# Patient Record
Sex: FEMALE | Race: WHITE | NOT HISPANIC OR LATINO | Employment: FULL TIME | URBAN - METROPOLITAN AREA
[De-identification: names, ages, dates, MRNs, and addresses within clinical notes are randomized per-mention and may not be internally consistent; named-entity substitution may affect disease eponyms.]

---

## 2017-01-09 ENCOUNTER — HOSPITAL ENCOUNTER (OUTPATIENT)
Dept: LAB | Facility: MEDICAL CENTER | Age: 32
End: 2017-01-09
Attending: SURGERY
Payer: COMMERCIAL

## 2017-01-09 LAB — TSH SERPL DL<=0.005 MIU/L-ACNC: 3.68 UIU/ML (ref 0.3–3.7)

## 2017-01-09 PROCEDURE — 36415 COLL VENOUS BLD VENIPUNCTURE: CPT

## 2017-01-09 PROCEDURE — 84443 ASSAY THYROID STIM HORMONE: CPT

## 2017-02-15 ENCOUNTER — HOSPITAL ENCOUNTER (OUTPATIENT)
Dept: LAB | Facility: MEDICAL CENTER | Age: 32
End: 2017-02-15
Attending: SURGERY
Payer: COMMERCIAL

## 2017-02-15 LAB — TSH SERPL DL<=0.005 MIU/L-ACNC: 2.12 UIU/ML (ref 0.3–3.7)

## 2017-02-15 PROCEDURE — 84443 ASSAY THYROID STIM HORMONE: CPT

## 2017-02-15 PROCEDURE — 36415 COLL VENOUS BLD VENIPUNCTURE: CPT

## 2017-06-12 ENCOUNTER — HOSPITAL ENCOUNTER (OUTPATIENT)
Dept: LAB | Facility: MEDICAL CENTER | Age: 32
End: 2017-06-12
Attending: FAMILY MEDICINE
Payer: COMMERCIAL

## 2017-06-12 LAB
T4 FREE SERPL-MCNC: 1.19 NG/DL (ref 0.53–1.43)
TSH SERPL DL<=0.005 MIU/L-ACNC: 2.77 UIU/ML (ref 0.3–3.7)

## 2017-06-12 PROCEDURE — 36415 COLL VENOUS BLD VENIPUNCTURE: CPT

## 2017-06-12 PROCEDURE — 84439 ASSAY OF FREE THYROXINE: CPT

## 2017-06-12 PROCEDURE — 84443 ASSAY THYROID STIM HORMONE: CPT

## 2017-11-09 ENCOUNTER — HOSPITAL ENCOUNTER (OUTPATIENT)
Dept: LAB | Facility: MEDICAL CENTER | Age: 32
End: 2017-11-09
Attending: FAMILY MEDICINE
Payer: COMMERCIAL

## 2017-11-09 LAB
ALBUMIN SERPL BCP-MCNC: 4.4 G/DL (ref 3.2–4.9)
ALBUMIN/GLOB SERPL: 1.7 G/DL
ALP SERPL-CCNC: 47 U/L (ref 30–99)
ALT SERPL-CCNC: 15 U/L (ref 2–50)
ANION GAP SERPL CALC-SCNC: 8 MMOL/L (ref 0–11.9)
AST SERPL-CCNC: 17 U/L (ref 12–45)
BASOPHILS # BLD AUTO: 1.1 % (ref 0–1.8)
BASOPHILS # BLD: 0.07 K/UL (ref 0–0.12)
BILIRUB SERPL-MCNC: 0.6 MG/DL (ref 0.1–1.5)
BUN SERPL-MCNC: 13 MG/DL (ref 8–22)
CALCIUM SERPL-MCNC: 8.9 MG/DL (ref 8.5–10.5)
CHLORIDE SERPL-SCNC: 104 MMOL/L (ref 96–112)
CO2 SERPL-SCNC: 24 MMOL/L (ref 20–33)
CREAT SERPL-MCNC: 0.72 MG/DL (ref 0.5–1.4)
CRP SERPL HS-MCNC: 1.8 MG/L (ref 0–7.5)
EOSINOPHIL # BLD AUTO: 0.12 K/UL (ref 0–0.51)
EOSINOPHIL NFR BLD: 2 % (ref 0–6.9)
ERYTHROCYTE [DISTWIDTH] IN BLOOD BY AUTOMATED COUNT: 39.3 FL (ref 35.9–50)
ERYTHROCYTE [SEDIMENTATION RATE] IN BLOOD BY WESTERGREN METHOD: 9 MM/HOUR (ref 0–20)
GFR SERPL CREATININE-BSD FRML MDRD: >60 ML/MIN/1.73 M 2
GLOBULIN SER CALC-MCNC: 2.6 G/DL (ref 1.9–3.5)
GLUCOSE SERPL-MCNC: 84 MG/DL (ref 65–99)
HCT VFR BLD AUTO: 44.1 % (ref 37–47)
HGB BLD-MCNC: 15.2 G/DL (ref 12–16)
IMM GRANULOCYTES # BLD AUTO: 0.02 K/UL (ref 0–0.11)
IMM GRANULOCYTES NFR BLD AUTO: 0.3 % (ref 0–0.9)
LIPASE SERPL-CCNC: 18 U/L (ref 11–82)
LYMPHOCYTES # BLD AUTO: 1.87 K/UL (ref 1–4.8)
LYMPHOCYTES NFR BLD: 30.5 % (ref 22–41)
MCH RBC QN AUTO: 31.3 PG (ref 27–33)
MCHC RBC AUTO-ENTMCNC: 34.5 G/DL (ref 33.6–35)
MCV RBC AUTO: 90.7 FL (ref 81.4–97.8)
MONOCYTES # BLD AUTO: 0.57 K/UL (ref 0–0.85)
MONOCYTES NFR BLD AUTO: 9.3 % (ref 0–13.4)
NEUTROPHILS # BLD AUTO: 3.48 K/UL (ref 2–7.15)
NEUTROPHILS NFR BLD: 56.8 % (ref 44–72)
NRBC # BLD AUTO: 0 K/UL
NRBC BLD AUTO-RTO: 0 /100 WBC
PLATELET # BLD AUTO: 231 K/UL (ref 164–446)
PMV BLD AUTO: 10.6 FL (ref 9–12.9)
POTASSIUM SERPL-SCNC: 4 MMOL/L (ref 3.6–5.5)
PROT SERPL-MCNC: 7 G/DL (ref 6–8.2)
RBC # BLD AUTO: 4.86 M/UL (ref 4.2–5.4)
SODIUM SERPL-SCNC: 136 MMOL/L (ref 135–145)
WBC # BLD AUTO: 6.1 K/UL (ref 4.8–10.8)

## 2017-11-09 PROCEDURE — 36415 COLL VENOUS BLD VENIPUNCTURE: CPT

## 2017-11-09 PROCEDURE — 83516 IMMUNOASSAY NONANTIBODY: CPT

## 2017-11-09 PROCEDURE — 85652 RBC SED RATE AUTOMATED: CPT

## 2017-11-09 PROCEDURE — 83690 ASSAY OF LIPASE: CPT

## 2017-11-09 PROCEDURE — 86141 C-REACTIVE PROTEIN HS: CPT

## 2017-11-09 PROCEDURE — 80053 COMPREHEN METABOLIC PANEL: CPT

## 2017-11-09 PROCEDURE — 85025 COMPLETE CBC W/AUTO DIFF WBC: CPT

## 2017-11-13 ENCOUNTER — HOSPITAL ENCOUNTER (OUTPATIENT)
Dept: LAB | Facility: MEDICAL CENTER | Age: 32
End: 2017-11-13
Attending: FAMILY MEDICINE
Payer: COMMERCIAL

## 2017-11-13 LAB — GLIADIN PEPTIDE+TTG IGA+IGG SER QL IA: 13 UNITS (ref 0–19)

## 2017-11-13 PROCEDURE — 83013 H PYLORI (C-13) BREATH: CPT

## 2017-11-15 LAB — UREA BREATH TEST QL: NEGATIVE

## 2017-12-26 ENCOUNTER — NON-PROVIDER VISIT (OUTPATIENT)
Dept: URGENT CARE | Facility: PHYSICIAN GROUP | Age: 32
End: 2017-12-26

## 2017-12-26 DIAGNOSIS — Z11.1 ENCOUNTER FOR PPD TEST: ICD-10-CM

## 2017-12-26 PROCEDURE — 86580 TB INTRADERMAL TEST: CPT | Performed by: PHYSICIAN ASSISTANT

## 2017-12-28 ENCOUNTER — NON-PROVIDER VISIT (OUTPATIENT)
Dept: URGENT CARE | Facility: PHYSICIAN GROUP | Age: 32
End: 2017-12-28

## 2017-12-28 DIAGNOSIS — Z11.1 ENCOUNTER FOR PPD SKIN TEST READING: ICD-10-CM

## 2017-12-28 LAB — TB WHEAL 3D P 5 TU DIAM: NORMAL MM

## 2018-03-27 ENCOUNTER — HOSPITAL ENCOUNTER (OUTPATIENT)
Dept: RADIOLOGY | Facility: MEDICAL CENTER | Age: 33
End: 2018-03-27
Attending: FAMILY MEDICINE
Payer: COMMERCIAL

## 2018-03-27 DIAGNOSIS — M25.569 KNEE PAIN, UNSPECIFIED CHRONICITY, UNSPECIFIED LATERALITY: ICD-10-CM

## 2018-03-27 PROCEDURE — 73560 X-RAY EXAM OF KNEE 1 OR 2: CPT | Mod: LT

## 2018-08-29 ENCOUNTER — HOSPITAL ENCOUNTER (OUTPATIENT)
Dept: LAB | Facility: MEDICAL CENTER | Age: 33
End: 2018-08-29
Attending: FAMILY MEDICINE
Payer: COMMERCIAL

## 2018-08-29 LAB — TSH SERPL DL<=0.005 MIU/L-ACNC: 2.61 UIU/ML (ref 0.38–5.33)

## 2018-08-29 PROCEDURE — 84443 ASSAY THYROID STIM HORMONE: CPT

## 2018-08-29 PROCEDURE — 36415 COLL VENOUS BLD VENIPUNCTURE: CPT

## 2018-09-08 ENCOUNTER — HOSPITAL ENCOUNTER (OUTPATIENT)
Facility: MEDICAL CENTER | Age: 33
End: 2018-09-08
Payer: COMMERCIAL

## 2018-09-08 LAB
BDY FAT % MEASURED: 29.7 %
BP DIAS: 74 MMHG
BP SYS: 114 MMHG
HYPERTENSION HTHYP: NO

## 2018-09-09 LAB
CHOLEST SERPL-MCNC: 202 MG/DL (ref 100–199)
FASTING STATUS PATIENT QL REPORTED: NORMAL
GLUCOSE SERPL-MCNC: 89 MG/DL (ref 65–99)
HDLC SERPL-MCNC: 52 MG/DL
LDLC SERPL CALC-MCNC: 134 MG/DL
TRIGL SERPL-MCNC: 79 MG/DL (ref 0–149)

## 2018-09-10 LAB
DIABETES HTDIA: NO
EVENT NAME HTEVT: NORMAL
SCREENING LOC CITY HTCIT: NORMAL
SCREENING LOC STATE HTSTA: NORMAL
SCREENING LOCATION HTLOC: NORMAL
SUBSCRIBER ID HTSID: NORMAL

## 2018-09-12 ENCOUNTER — HOSPITAL ENCOUNTER (OUTPATIENT)
Dept: LAB | Facility: MEDICAL CENTER | Age: 33
End: 2018-09-12
Attending: FAMILY MEDICINE
Payer: COMMERCIAL

## 2018-09-12 LAB
25(OH)D3 SERPL-MCNC: 46 NG/ML (ref 30–100)
ALBUMIN SERPL BCP-MCNC: 4.2 G/DL (ref 3.2–4.9)
ALBUMIN/GLOB SERPL: 1.6 G/DL
ALP SERPL-CCNC: 41 U/L (ref 30–99)
ALT SERPL-CCNC: 15 U/L (ref 2–50)
ANION GAP SERPL CALC-SCNC: 9 MMOL/L (ref 0–11.9)
AST SERPL-CCNC: 16 U/L (ref 12–45)
BASOPHILS # BLD AUTO: 0.6 % (ref 0–1.8)
BASOPHILS # BLD: 0.03 K/UL (ref 0–0.12)
BILIRUB SERPL-MCNC: 0.6 MG/DL (ref 0.1–1.5)
BUN SERPL-MCNC: 12 MG/DL (ref 8–22)
CALCIUM SERPL-MCNC: 9.3 MG/DL (ref 8.5–10.5)
CHLORIDE SERPL-SCNC: 109 MMOL/L (ref 96–112)
CO2 SERPL-SCNC: 23 MMOL/L (ref 20–33)
CREAT SERPL-MCNC: 0.81 MG/DL (ref 0.5–1.4)
EOSINOPHIL # BLD AUTO: 0.09 K/UL (ref 0–0.51)
EOSINOPHIL NFR BLD: 1.7 % (ref 0–6.9)
ERYTHROCYTE [DISTWIDTH] IN BLOOD BY AUTOMATED COUNT: 41.1 FL (ref 35.9–50)
ERYTHROCYTE [SEDIMENTATION RATE] IN BLOOD BY WESTERGREN METHOD: 5 MM/HOUR (ref 0–20)
EST. AVERAGE GLUCOSE BLD GHB EST-MCNC: 108 MG/DL
FASTING STATUS PATIENT QL REPORTED: NORMAL
FERRITIN SERPL-MCNC: 53.7 NG/ML (ref 10–291)
GLOBULIN SER CALC-MCNC: 2.7 G/DL (ref 1.9–3.5)
GLUCOSE SERPL-MCNC: 96 MG/DL (ref 65–99)
HBA1C MFR BLD: 5.4 % (ref 0–5.6)
HCT VFR BLD AUTO: 44.4 % (ref 37–47)
HGB BLD-MCNC: 15.1 G/DL (ref 12–16)
IMM GRANULOCYTES # BLD AUTO: 0.03 K/UL (ref 0–0.11)
IMM GRANULOCYTES NFR BLD AUTO: 0.6 % (ref 0–0.9)
LYMPHOCYTES # BLD AUTO: 1.79 K/UL (ref 1–4.8)
LYMPHOCYTES NFR BLD: 32.9 % (ref 22–41)
MCH RBC QN AUTO: 31.4 PG (ref 27–33)
MCHC RBC AUTO-ENTMCNC: 34 G/DL (ref 33.6–35)
MCV RBC AUTO: 92.3 FL (ref 81.4–97.8)
MONOCYTES # BLD AUTO: 0.32 K/UL (ref 0–0.85)
MONOCYTES NFR BLD AUTO: 5.9 % (ref 0–13.4)
NEUTROPHILS # BLD AUTO: 3.18 K/UL (ref 2–7.15)
NEUTROPHILS NFR BLD: 58.3 % (ref 44–72)
NRBC # BLD AUTO: 0 K/UL
NRBC BLD-RTO: 0 /100 WBC
PLATELET # BLD AUTO: 216 K/UL (ref 164–446)
PMV BLD AUTO: 10.4 FL (ref 9–12.9)
POTASSIUM SERPL-SCNC: 3.8 MMOL/L (ref 3.6–5.5)
PROT SERPL-MCNC: 6.9 G/DL (ref 6–8.2)
RBC # BLD AUTO: 4.81 M/UL (ref 4.2–5.4)
SODIUM SERPL-SCNC: 141 MMOL/L (ref 135–145)
THYROPEROXIDASE AB SERPL-ACNC: <0.2 IU/ML (ref 0–9)
WBC # BLD AUTO: 5.4 K/UL (ref 4.8–10.8)

## 2018-09-12 PROCEDURE — 80053 COMPREHEN METABOLIC PANEL: CPT

## 2018-09-12 PROCEDURE — 36415 COLL VENOUS BLD VENIPUNCTURE: CPT

## 2018-09-12 PROCEDURE — 86039 ANTINUCLEAR ANTIBODIES (ANA): CPT

## 2018-09-12 PROCEDURE — 85025 COMPLETE CBC W/AUTO DIFF WBC: CPT

## 2018-09-12 PROCEDURE — 86376 MICROSOMAL ANTIBODY EACH: CPT

## 2018-09-12 PROCEDURE — 85652 RBC SED RATE AUTOMATED: CPT

## 2018-09-12 PROCEDURE — 82728 ASSAY OF FERRITIN: CPT

## 2018-09-12 PROCEDURE — 83036 HEMOGLOBIN GLYCOSYLATED A1C: CPT

## 2018-09-12 PROCEDURE — 81001 URINALYSIS AUTO W/SCOPE: CPT

## 2018-09-12 PROCEDURE — 87086 URINE CULTURE/COLONY COUNT: CPT

## 2018-09-12 PROCEDURE — 82306 VITAMIN D 25 HYDROXY: CPT

## 2018-09-13 LAB
AMORPH CRY #/AREA URNS HPF: PRESENT /HPF
APPEARANCE UR: ABNORMAL
BACTERIA #/AREA URNS HPF: ABNORMAL /HPF
COLOR UR: YELLOW
GLUCOSE UR STRIP.AUTO-MCNC: NEGATIVE MG/DL
KETONES UR STRIP.AUTO-MCNC: NEGATIVE MG/DL
LEUKOCYTE ESTERASE UR QL STRIP.AUTO: NEGATIVE
MICRO URNS: ABNORMAL
NITRITE UR QL STRIP.AUTO: NEGATIVE
PH UR STRIP.AUTO: 7 [PH]
PROT UR QL STRIP: NEGATIVE MG/DL
RBC UR QL AUTO: NEGATIVE
SP GR UR STRIP.AUTO: 1.02

## 2018-09-14 LAB — NUCLEAR IGG TITR SER IF: ABNORMAL {TITER}

## 2018-09-15 LAB
BACTERIA UR CULT: NORMAL
SIGNIFICANT IND 70042: NORMAL
SITE SITE: NORMAL
SOURCE SOURCE: NORMAL

## 2018-11-02 ENCOUNTER — OFFICE VISIT (OUTPATIENT)
Dept: NEUROLOGY | Facility: MEDICAL CENTER | Age: 33
End: 2018-11-02
Payer: COMMERCIAL

## 2018-11-02 ENCOUNTER — HOSPITAL ENCOUNTER (OUTPATIENT)
Dept: LAB | Facility: MEDICAL CENTER | Age: 33
End: 2018-11-02
Attending: PSYCHIATRY & NEUROLOGY
Payer: COMMERCIAL

## 2018-11-02 VITALS
HEART RATE: 64 BPM | OXYGEN SATURATION: 97 % | HEIGHT: 72 IN | WEIGHT: 180 LBS | TEMPERATURE: 97.8 F | BODY MASS INDEX: 24.38 KG/M2 | SYSTOLIC BLOOD PRESSURE: 110 MMHG | DIASTOLIC BLOOD PRESSURE: 68 MMHG

## 2018-11-02 DIAGNOSIS — R35.0 URINARY FREQUENCY: ICD-10-CM

## 2018-11-02 DIAGNOSIS — R20.2 NUMBNESS AND TINGLING: ICD-10-CM

## 2018-11-02 DIAGNOSIS — R20.0 NUMBNESS AND TINGLING: ICD-10-CM

## 2018-11-02 DIAGNOSIS — H35.30 MACULAR DEGENERATION, UNSPECIFIED LATERALITY, UNSPECIFIED TYPE: ICD-10-CM

## 2018-11-02 DIAGNOSIS — R41.3 MEMORY CHANGES: ICD-10-CM

## 2018-11-02 DIAGNOSIS — R76.8 ELEVATED ANTINUCLEAR ANTIBODY (ANA) LEVEL: ICD-10-CM

## 2018-11-02 DIAGNOSIS — R53.83 FATIGUE, UNSPECIFIED TYPE: ICD-10-CM

## 2018-11-02 LAB
ALBUMIN SERPL BCP-MCNC: 4.7 G/DL (ref 3.2–4.9)
ALBUMIN/GLOB SERPL: 1.6 G/DL
ALP SERPL-CCNC: 49 U/L (ref 30–99)
ALT SERPL-CCNC: 26 U/L (ref 2–50)
ANION GAP SERPL CALC-SCNC: 11 MMOL/L (ref 0–11.9)
APPEARANCE UR: CLEAR
AST SERPL-CCNC: 20 U/L (ref 12–45)
BILIRUB SERPL-MCNC: 0.6 MG/DL (ref 0.1–1.5)
BILIRUB UR QL STRIP.AUTO: NEGATIVE
BUN SERPL-MCNC: 12 MG/DL (ref 8–22)
CALCIUM SERPL-MCNC: 9.6 MG/DL (ref 8.5–10.5)
CHLORIDE SERPL-SCNC: 108 MMOL/L (ref 96–112)
CO2 SERPL-SCNC: 23 MMOL/L (ref 20–33)
COLOR UR: YELLOW
CREAT SERPL-MCNC: 0.81 MG/DL (ref 0.5–1.4)
GLOBULIN SER CALC-MCNC: 2.9 G/DL (ref 1.9–3.5)
GLUCOSE SERPL-MCNC: 126 MG/DL (ref 65–99)
GLUCOSE UR STRIP.AUTO-MCNC: NEGATIVE MG/DL
KETONES UR STRIP.AUTO-MCNC: ABNORMAL MG/DL
LEUKOCYTE ESTERASE UR QL STRIP.AUTO: NEGATIVE
MICRO URNS: ABNORMAL
NITRITE UR QL STRIP.AUTO: NEGATIVE
PH UR STRIP.AUTO: 5.5 [PH]
POTASSIUM SERPL-SCNC: 3.1 MMOL/L (ref 3.6–5.5)
PROT SERPL-MCNC: 7.6 G/DL (ref 6–8.2)
PROT UR QL STRIP: NEGATIVE MG/DL
RBC UR QL AUTO: ABNORMAL
SODIUM SERPL-SCNC: 142 MMOL/L (ref 135–145)
SP GR UR STRIP.AUTO: 1.02
TSH SERPL DL<=0.005 MIU/L-ACNC: 1.27 UIU/ML (ref 0.38–5.33)
UROBILINOGEN UR STRIP.AUTO-MCNC: 0.2 MG/DL
VIT B12 SERPL-MCNC: 499 PG/ML (ref 211–911)

## 2018-11-02 PROCEDURE — 86431 RHEUMATOID FACTOR QUANT: CPT

## 2018-11-02 PROCEDURE — 86038 ANTINUCLEAR ANTIBODIES: CPT

## 2018-11-02 PROCEDURE — 81001 URINALYSIS AUTO W/SCOPE: CPT

## 2018-11-02 PROCEDURE — 86235 NUCLEAR ANTIGEN ANTIBODY: CPT

## 2018-11-02 PROCEDURE — 84160 ASSAY OF PROTEIN ANY SOURCE: CPT

## 2018-11-02 PROCEDURE — 84443 ASSAY THYROID STIM HORMONE: CPT

## 2018-11-02 PROCEDURE — 84165 PROTEIN E-PHORESIS SERUM: CPT

## 2018-11-02 PROCEDURE — 80053 COMPREHEN METABOLIC PANEL: CPT

## 2018-11-02 PROCEDURE — 36415 COLL VENOUS BLD VENIPUNCTURE: CPT

## 2018-11-02 PROCEDURE — 82607 VITAMIN B-12: CPT

## 2018-11-02 PROCEDURE — 99205 OFFICE O/P NEW HI 60 MIN: CPT | Performed by: PSYCHIATRY & NEUROLOGY

## 2018-11-02 PROCEDURE — 86160 COMPLEMENT ANTIGEN: CPT

## 2018-11-02 RX ORDER — VITS A,C,E/LUTEIN/MINERALS 300MCG-200
1 TABLET ORAL EVERY EVENING
COMMUNITY

## 2018-11-02 RX ORDER — LEVOTHYROXINE SODIUM 0.07 MG/1
75 TABLET ORAL
COMMUNITY

## 2018-11-02 ASSESSMENT — PATIENT HEALTH QUESTIONNAIRE - PHQ9: CLINICAL INTERPRETATION OF PHQ2 SCORE: 0

## 2018-11-02 NOTE — PROGRESS NOTES
"CC: Fatigue and balance issues      HPI:    Benito Fish is a 33 y.o. Female with a pmhx of partial thyroidectomy due to a thyroid mass who presents today in initial neurologic consultation. The patient was referred by their primary care provider, Terrell BROCK M.D. She is accompanied by her spouse to today's visit.    Ms. Fish tells me she has noticed increased hair loss, fatigue, and balance issues since August 2018. Her primary care provider ordered bloodwork including an KEVIN which resulted as positive with a titer of 1:320. Sed rate was 5 and her CBC was unremarkable. She has a Rheumatology appointment but it is not scheduled until February.     Since May 2014, she has had episodic fatigue. This has increased significantly over the past 4-5 months in addition to muscle cramping in her arms and legs. She has had tingling in her hands and feet for several years. She saw an orthopedist for severe feet cramping, but they did not offer a diagnosis. She has also been irritable recently.    In April 2016, she was on Mirena (currently on Depo) and experienced abdominal cramping for one year. She had her mirena removed and tried Nexplanon. During this time, she had GI symptoms and was placed on gallbladder medication cholestid. She had a colonoscopy to evaluate these symptoms which was negative. In May 2016, it was realized the GI symptoms were possibly a side effect from Nexplanon, it was removed her GI symptoms improved. She then stopped the cholestid.     In June 2016, she was diagnosed with Macular Degeneration during a regular optometry visit where she had only expected to get a new prescription for her glasses. She then went for evaluation at [facility] who confirmed this diagnosis. They recommended vitamins.  Her mother has eye issues where her blood vessels are larger. She had an \"avocado sized mass\" on her thyroid removed later that year which was found to be benign.     Since " August/September 2018, she started to have balance issues. She feels like she will fall over. She has been noticing some weakness inher hands and legs, difficulty concentrating and reading. She had been someone who read constantly, but now is not reading at all.     Prior to moving to [place], she saw a neurologist in [place] who was evaluating her for possible seizures. She was not allowed to drive for 2 months, but ultimately, she was told she likely had been experiencing migraines and was started on Topamax which alleviated her symptoms      ROS:   Constitutional: No fevers or chills. + more colds and flu  Eyes: No blurry vision or eye pain.  ENT: No dysphagia or hearing loss.  Respiratory: No cough or shortness of breath.  Cardiovascular: No chest pain or palpitations.  GI: No nausea, vomiting, or diarrhea.  : +urinary frequency x 3 months - treated for a UTI recently, but no test for cure completed.   Musculoskeletal: No joint swelling or arthralgias.  Skin: No skin rashes.  Neuro: No headaches, dizziness, or tremors.  Endocrine: No heat or cold intolerance. No polydipsia or polyuria.  Psych: No depression or anxiety.  Heme/Lymph: No easy bruising or swollen lymph nodes.      Past Medical History:   Past Medical History:   Diagnosis Date   • Cold 8/2016    severe hayfever   • Anesthesia     took a really long time to wake up (3 days)   • Depression    • Hemorrhagic disorder (HCC)     nose bleeds from allergies, flonase       Past Surgical History:   Past Surgical History:   Procedure Laterality Date   • THYROIDECTOMY TOTAL  9/9/2016    Procedure: RIGHT THYROIDECTOMY  UNILATERAL, NIMS RECURRENT LARYNGEAL NERVE MONITORING;  Surgeon: Nita Sage M.D.;  Location: SURGERY [facility];  Service:    • OTHER ABDOMINAL SURGERY  4/2009    appy   • OTHER ORTHOPEDIC SURGERY  2005    ankle repair left, ligament repair   • APPENDECTOMY         Social History:   Social History     Social History   • Marital  status:      Spouse name: N/A   • Number of children: N/A   • Years of education: N/A     Occupational History   • Not on file.     Social History Main Topics   • Smoking status: Never Smoker   • Smokeless tobacco: Never Used   • Alcohol use Yes      Comment: 1 per week   • Drug use: No   • Sexual activity: Yes     Partners: Female     Other Topics Concern   • Not on file     Social History Narrative   • No narrative on file       Family History:   Family History   Problem Relation Age of Onset   • Thyroid Mother    • Seizures Father    • Hyperlipidemia Father    • Hypertension Father    • Thyroid Sister    • Alcohol/Drug Brother    • Rheumatologic Disease Maternal Grandmother         RA   • Alcohol/Drug Sister        Allergies:   Allergies   Allergen Reactions   • Codeine Vomiting     [id number] 8 to 9 hours of vomiting   • Other Environmental      Severe hayfever       Physical Exam:     Ambulatory Vitals  Encounter Vitals  Temperature: 36.6 °C (97.8 °F)  Blood Pressure: 110/68  Pulse: 64  Pulse Oximetry: 97 %  Weight: 81.6 kg (180 lb)  Height: 182.9 cm (6')  BMI (Calculated): 24.41  Constitutional: Well-developed, well-nourished, good hygiene. Appears stated age.  Cardiovascular: RRR, with no murmurs, rubs or gallops. No carotid bruits.   Respiratory: Lungs CTA B/L, no W/R/R.   Abdomen: Soft Non-tender to Palpation. Non-distended.  Extremities: No peripheral edema, pedal pulses intact.   Skin: Warm, dry, intact. No rashes observed.  Eyes: Sclera anicteric   Funduscopic: Optic discs not well visualized on exam today.   Neurologic:   Mental Status: Awake, alert, oriented x 3.   Speech: Fluent with normal prosody.   Memory: Able to recall recent and remote events accurately.    Concentration: Attentive. Able to focus on history and follow multi-step commands.              Fund of Knowledge: Appropriate   Cranial Nerves:    CN II: PERRL. No afferent pupillary defect.    CN III, IV, VI: Good eye closure, EOMI.      CN V: Facial sensation intact and symmetric.     CN VII: No facial asymmetry.     CN VIII: Hearing intact.     CN IX and X: Palate elevates symmetrically. Normal gag reflex.    CN XI: Symmetric shoulder shrug.     CN XII: Tongue midline.    Sensory: Intact light touch, vibration and temperature.    Coordination: Slight past pointing with left>right upper extremities. Heel to shin intact.           DTR's: 3+ in the knees, otherwise 2+ throughout without clonus.    Babinski: Toes upgoing bilaterally.   Romberg: Negative.   Movements: No tremors observed.   Musculoskeletal:    Strength: 5/5 in upper and lower extremities bilaterally.   Gait: Steady, narrow based.    Tone: Normal bulk and tone.   Joints: No swelling.     Labs:  Results for SEAMUS NAVARRO (MRN [id number]) as of 11/4/2018 18:14   Ref. Range 8/29/2018 07:51 9/12/2018 10:16 11/2/2018 15:40   Vitamin B12 -True Cobalamin Latest Ref Range: 211 - 911 pg/mL   499   Ferritin Latest Ref Range: 10.0 - 291.0 ng/mL  53.7    25-Hydroxy   Vitamin D 25 Latest Ref Range: 30 - 100 ng/mL  46    TSH Latest Ref Range: 0.380 - 5.330 uIU/mL 2.610  1.270   Microsomal -Tpo- Abs Latest Ref Range: 0.0 - 9.0 IU/mL  <0.2    KEVIN Titer Latest Ref Range: <1:80   1:320 (H)      Results for SEAMUS NAVARRO (MRN [id number]) as of 11/4/2018 18:14   Ref. Range 9/12/2018 10:16   WBC Latest Ref Range: 4.8 - 10.8 K/uL 5.4   RBC Latest Ref Range: 4.20 - 5.40 M/uL 4.81   Hemoglobin Latest Ref Range: 12.0 - 16.0 g/dL 15.1   Hematocrit Latest Ref Range: 37.0 - 47.0 % 44.4   MCV Latest Ref Range: 81.4 - 97.8 fL 92.3   MCH Latest Ref Range: 27.0 - 33.0 pg 31.4   MCHC Latest Ref Range: 33.6 - 35.0 g/dL 34.0   RDW Latest Ref Range: 35.9 - 50.0 fL 41.1   Platelet Count Latest Ref Range: 164 - 446 K/uL 216   MPV Latest Ref Range: 9.0 - 12.9 fL 10.4   Neutrophils-Polys Latest Ref Range: 44.00 - 72.00 % 58.30   Neutrophils (Absolute) Latest Ref Range: 2.00 - 7.15 K/uL 3.18   Lymphocytes Latest Ref  Range: 22.00 - 41.00 % 32.90   Lymphs (Absolute) Latest Ref Range: 1.00 - 4.80 K/uL 1.79   Monocytes Latest Ref Range: 0.00 - 13.40 % 5.90   Monos (Absolute) Latest Ref Range: 0.00 - 0.85 K/uL 0.32   Eosinophils Latest Ref Range: 0.00 - 6.90 % 1.70   Eos (Absolute) Latest Ref Range: 0.00 - 0.51 K/uL 0.09   Basophils Latest Ref Range: 0.00 - 1.80 % 0.60   Baso (Absolute) Latest Ref Range: 0.00 - 0.12 K/uL 0.03   Immature Granulocytes Latest Ref Range: 0.00 - 0.90 % 0.60   Immature Granulocytes (abs) Latest Ref Range: 0.00 - 0.11 K/uL 0.03   Nucleated RBC Latest Units: /100 WBC 0.00   NRBC (Absolute) Latest Units: K/uL 0.00   Sed Rate Westergren Latest Ref Range: 0 - 20 mm/hour 5   Results for SEAMUS NAVARRO (MRN [id number]) as of 11/4/2018 18:14   Ref. Range 11/2/2018 15:40   Sodium Latest Ref Range: 135 - 145 mmol/L 142   Potassium Latest Ref Range: 3.6 - 5.5 mmol/L 3.1 (L)   Chloride Latest Ref Range: 96 - 112 mmol/L 108   Co2 Latest Ref Range: 20 - 33 mmol/L 23   Anion Gap Latest Ref Range: 0.0 - 11.9  11.0   Glucose Latest Ref Range: 65 - 99 mg/dL 126 (H)   Bun Latest Ref Range: 8 - 22 mg/dL 12   Creatinine Latest Ref Range: 0.50 - 1.40 mg/dL 0.81   GFR If  Latest Ref Range: >60 mL/min/1.73 m 2 >60   GFR If Non  Latest Ref Range: >60 mL/min/1.73 m 2 >60   Calcium Latest Ref Range: 8.5 - 10.5 mg/dL 9.6   AST(SGOT) Latest Ref Range: 12 - 45 U/L 20   ALT(SGPT) Latest Ref Range: 2 - 50 U/L 26   Alkaline Phosphatase Latest Ref Range: 30 - 99 U/L 49   Total Bilirubin Latest Ref Range: 0.1 - 1.5 mg/dL 0.6   Albumin Latest Ref Range: 3.2 - 4.9 g/dL 4.7   Total Protein Latest Ref Range: 6.0 - 8.2 g/dL 7.6   Globulin Latest Ref Range: 1.9 - 3.5 g/dL 2.9   A-G Ratio Latest Units: g/dL 1.6       Assessment/Plan:  32 yo F with h/o benign thyroid mass, macular degeneration, elevated KEVIN, reports noticeable worsening of fatigue, urinary frequency, memory changes, numbness and tingling in the  "bilateral hands and feet. Unclear if the symptoms are all attributable to the same diagnosis or may be unrelated. The patient is concerned that she may have MS. She has described symptoms of a typical \"attack\" however, MRI imaging should and will be part of her workup.     Macular degeneration  Macular degeneration in such a young person is quite rare and may be associated with a larger syndrome, genetic or otherwise. I would like her to see Dr. Daniel Mabry, neuro-ophthalmologist for his opinion on her diagnosis and for any additional testing recommendations.     Urinary frequency  Will repeat her U/A as test for cure.     Fatigue  Will check vitamin B12 and TSH. Abnormalities of either can cause memory changes as well as fatigue.    Check TSH and vitamin B12    Memory changes  Self-reported memory changes also observed by spouse. No serious issues at work. Will check a brain MRI as such complaints are quite atypical for her age.     Plan:  1. Check brain MRI w/w/o contrast    Numbness and tingling  Will check c-spine MRI w/wo contrast.   Check serum protein electropheresis  Sjogren's antibodies      Elevated antinuclear antibody (KEVIN) level  Will check a Lupus profile.      Greater than 50% of this 60 minute face to face encounter was devoted to disease state counseling and coordination of care.  Please see above assessment and plan for discussion.       Elli Real D.O., M.P.H  MS specialist.   Board Certified Neurologist.  Neurology Clerkship Director, [facility].    Neurology,  [facility].   Tel: [phone]  Fax: [phone]    "

## 2018-11-03 LAB
BACTERIA #/AREA URNS HPF: ABNORMAL /HPF
EPI CELLS #/AREA URNS HPF: NEGATIVE /HPF
HYALINE CASTS #/AREA URNS LPF: ABNORMAL /LPF
RBC # URNS HPF: ABNORMAL /HPF
WBC #/AREA URNS HPF: ABNORMAL /HPF

## 2018-11-04 PROBLEM — R41.3 MEMORY CHANGES: Status: ACTIVE | Noted: 2018-11-04

## 2018-11-04 PROBLEM — H35.30 MACULAR DEGENERATION: Status: ACTIVE | Noted: 2018-11-04

## 2018-11-04 PROBLEM — R53.83 FATIGUE: Status: ACTIVE | Noted: 2018-11-04

## 2018-11-04 PROBLEM — R35.0 URINARY FREQUENCY: Status: ACTIVE | Noted: 2018-11-04

## 2018-11-04 PROBLEM — R20.0 NUMBNESS AND TINGLING: Status: ACTIVE | Noted: 2018-11-04

## 2018-11-04 PROBLEM — R20.2 NUMBNESS AND TINGLING: Status: ACTIVE | Noted: 2018-11-04

## 2018-11-04 PROBLEM — R76.8 ELEVATED ANTINUCLEAR ANTIBODY (ANA) LEVEL: Status: ACTIVE | Noted: 2018-11-04

## 2018-11-05 LAB
C3 SERPL-MCNC: 141 MG/DL (ref 88–201)
C4 SERPL-MCNC: 35 MG/DL (ref 10–40)
NUCLEAR IGG SER QL IA: NORMAL
RHEUMATOID FACT SER NEPH-ACNC: <10 IU/ML (ref 0–14)

## 2018-11-05 NOTE — ASSESSMENT & PLAN NOTE
Will check vitamin B12 and TSH. Abnormalities of either can cause memory changes as well as fatigue.    Check TSH and vitamin B12

## 2018-11-05 NOTE — ASSESSMENT & PLAN NOTE
Will check c-spine MRI w/wo contrast.   Check serum protein electropheresis  Sjogren's antibodies

## 2018-11-05 NOTE — ASSESSMENT & PLAN NOTE
Self-reported memory changes also observed by spouse. No serious issues at work. Will check a brain MRI as such complaints are quite atypical for her age.     Plan:  1. Check brain MRI w/w/o contrast

## 2018-11-05 NOTE — ASSESSMENT & PLAN NOTE
Macular degeneration in such a young person is quite rare and may be associated with a larger syndrome, genetic or otherwise. I would like her to see Dr. Daniel Mabry, neuro-ophthalmologist for his opinion on her diagnosis and for any additional testing recommendations.

## 2018-11-06 LAB
ALBUMIN SERPL-MCNC: 4.53 G/DL (ref 3.75–5.01)
ALPHA1 GLOB SERPL ELPH-MCNC: 0.3 G/DL (ref 0.19–0.46)
ALPHA2 GLOB SERPL ELPH-MCNC: 0.67 G/DL (ref 0.48–1.05)
B-GLOBULIN SERPL ELPH-MCNC: 0.88 G/DL (ref 0.48–1.1)
ENA SS-B IGG SER IA-ACNC: 10 AU/ML (ref 0–40)
GAMMA GLOB SERPL ELPH-MCNC: 1.02 G/DL (ref 0.62–1.51)
INTERPRETATION SERPL IFE-IMP: NORMAL
MONOCLON BAND OBS SERPL: NORMAL
PATHOLOGY STUDY: NORMAL
PROT SERPL-MCNC: 7.4 G/DL (ref 6–8.3)
SSA52 R0ENA AB IGG Q0420: 2 AU/ML (ref 0–40)
SSA60 R0ENA AB IGG Q0419: 11 AU/ML (ref 0–40)

## 2018-12-06 ENCOUNTER — HOSPITAL ENCOUNTER (OUTPATIENT)
Dept: RADIOLOGY | Facility: MEDICAL CENTER | Age: 33
End: 2018-12-06
Attending: PSYCHIATRY & NEUROLOGY
Payer: COMMERCIAL

## 2018-12-06 DIAGNOSIS — R20.2 NUMBNESS AND TINGLING: ICD-10-CM

## 2018-12-06 DIAGNOSIS — R20.0 NUMBNESS AND TINGLING: ICD-10-CM

## 2018-12-06 DIAGNOSIS — R41.3 MEMORY CHANGES: ICD-10-CM

## 2018-12-06 PROCEDURE — 72156 MRI NECK SPINE W/O & W/DYE: CPT

## 2018-12-06 PROCEDURE — A9585 GADOBUTROL INJECTION: HCPCS | Performed by: PSYCHIATRY & NEUROLOGY

## 2018-12-06 PROCEDURE — 700117 HCHG RX CONTRAST REV CODE 255: Performed by: PSYCHIATRY & NEUROLOGY

## 2018-12-06 PROCEDURE — 70553 MRI BRAIN STEM W/O & W/DYE: CPT

## 2018-12-06 RX ORDER — GADOBUTROL 604.72 MG/ML
7.5 INJECTION INTRAVENOUS ONCE
Status: COMPLETED | OUTPATIENT
Start: 2018-12-06 | End: 2018-12-06

## 2018-12-06 RX ADMIN — GADOBUTROL 7.5 ML: 604.72 INJECTION INTRAVENOUS at 12:31

## 2018-12-10 ENCOUNTER — OFFICE VISIT (OUTPATIENT)
Dept: NEUROLOGY | Facility: MEDICAL CENTER | Age: 33
End: 2018-12-10
Payer: COMMERCIAL

## 2018-12-10 VITALS
HEART RATE: 68 BPM | HEIGHT: 72 IN | OXYGEN SATURATION: 98 % | DIASTOLIC BLOOD PRESSURE: 68 MMHG | BODY MASS INDEX: 25.25 KG/M2 | WEIGHT: 186.4 LBS | TEMPERATURE: 97.9 F | SYSTOLIC BLOOD PRESSURE: 112 MMHG

## 2018-12-10 DIAGNOSIS — R76.8 ELEVATED ANTINUCLEAR ANTIBODY (ANA) LEVEL: ICD-10-CM

## 2018-12-10 DIAGNOSIS — R29.2 HYPERREFLEXIA: ICD-10-CM

## 2018-12-10 DIAGNOSIS — R53.83 FATIGUE, UNSPECIFIED TYPE: ICD-10-CM

## 2018-12-10 DIAGNOSIS — H35.30 MACULAR DEGENERATION, UNSPECIFIED LATERALITY, UNSPECIFIED TYPE: ICD-10-CM

## 2018-12-10 DIAGNOSIS — R41.3 MEMORY CHANGES: ICD-10-CM

## 2018-12-10 PROCEDURE — 99215 OFFICE O/P EST HI 40 MIN: CPT | Performed by: PSYCHIATRY & NEUROLOGY

## 2018-12-10 NOTE — PROGRESS NOTES
CC: Fatigue and Gait Imbalance      HPI:    Benito Fish is a 33 y.o. female who presents today in Neurological follow up for multiple symptoms including fatigue and gait imbalance. She is again accompanied by her spouse to today's visit. She was last seen on 11/2/18. Since that time, her fatigue has improved. She continues to have muscle cramps and weakness. Her knees and hands feel weak to her. She has not been able to get an appointment with Dr. Mabry regarding her diagnosis of Macular Degeneration. She has an appointment scheduled with a rheumatologist in February 2018 for her elevated KEVIN       ROS:   Constitutional: No fevers or chills.  Eyes: No blurry vision or eye pain.  ENT: No dysphagia or hearing loss.  Respiratory: No cough or shortness of breath.  Cardiovascular: No chest pain or palpitations.  GI: No nausea, vomiting, or diarrhea.  : No urinary incontinence or dysuria.  Musculoskeletal: No joint swelling or arthralgias.  Skin: No skin rashes.  Neuro: No headaches, dizziness, or tremors.  Endocrine: No heat or cold intolerance. No polydipsia or polyuria.  Psych: No depression or anxiety.  Heme/Lymph: No easy bruising or swollen lymph nodes.      Past Medical History:   Past Medical History:   Diagnosis Date   • Cold 8/2016    severe hayfever   • Anesthesia     took a really long time to wake up (3 days)   • Depression    • Hemorrhagic disorder (HCC)     nose bleeds from allergies, flonase       Past Surgical History:   Past Surgical History:   Procedure Laterality Date   • THYROIDECTOMY TOTAL  9/9/2016    Procedure: RIGHT THYROIDECTOMY  UNILATERAL, NIMS RECURRENT LARYNGEAL NERVE MONITORING;  Surgeon: Nita Sage M.D.;  Location: SURGERY [facility];  Service:    • OTHER ABDOMINAL SURGERY  4/2009    appy   • OTHER ORTHOPEDIC SURGERY  2005    ankle repair left, ligament repair   • APPENDECTOMY         Social History:   Social History     Social History   • Marital status:       Spouse name: N/A   • Number of children: N/A   • Years of education: N/A     Occupational History   • Not on file.     Social History Main Topics   • Smoking status: Never Smoker   • Smokeless tobacco: Never Used   • Alcohol use Yes      Comment: 1 per week   • Drug use: No   • Sexual activity: Yes     Partners: Female     Other Topics Concern   • Not on file     Social History Narrative   • No narrative on file       Family History:   Family History   Problem Relation Age of Onset   • Thyroid Mother    • Seizures Father    • Hyperlipidemia Father    • Hypertension Father    • Thyroid Sister    • Alcohol/Drug Brother    • Rheumatologic Disease Maternal Grandmother         RA   • Alcohol/Drug Sister        Allergies:   Allergies   Allergen Reactions   • Codeine Vomiting     [id number] 8 to 9 hours of vomiting   • Other Environmental      Severe hayfever       Physical Exam:     Ambulatory Vitals  Encounter Vitals  Temperature: 36.6 °C (97.9 °F)  Temp src: Temporal  Blood Pressure: 112/68  Pulse: 68  Pulse Oximetry: 98 %  Weight: 84.6 kg (186 lb 6.4 oz)  Height: 182.9 cm (6')  BMI (Calculated): 25.28    Constitutional: Well-developed, well-nourished, good hygiene. Appears stated age.  Cardiovascular: RRR, with no murmurs, rubs or gallops. No carotid bruits.   Respiratory: Lungs CTA B/L, no W/R/R.   Abdomen: Soft Non-tender to Palpation. Non-distended.  Extremities: No peripheral edema, pedal pulses intact.   Skin: Warm, dry, intact. No rashes observed.  Eyes: Sclera anicteric  Neurologic:   Mental Status: Awake, alert, oriented x 3.   Speech: Fluent with normal prosody.   Memory: Able to recall recent and remote events accurately.    Concentration: Attentive. Able to focus on history and follow multi-step commands.              Fund of Knowledge: Appropriate   Cranial Nerves:    CN II: PERRL. No afferent pupillary defect.    CN III, IV, VI: Good eye closure, EOMI.     CN V: Facial sensation intact and symmetric.     CN  VII: No facial asymmetry.     CN VIII: Hearing intact.     CN IX and X: Palate elevates symmetrically. Normal gag reflex.    CN XI: Symmetric shoulder shrug.     CN XII: Tongue midline.    Sensory: Intact light touch, vibration and temperature.    Coordination: No evidence of past-pointing on finger to nose testing, no dysdiadochokinesia. Heel to shin intact.             DTR's: 3+ in the knees, otherwise 2+ without clonus.    Babinski: Toes upgoing bilaterally.   Romberg: Negative.   Movements: No tremors observed.   Musculoskeletal:    Strength: 5/5 in upper and lower extremities bilaterally.   Gait: Steady, narrow based.    Tone: Normal bulk and tone.   Joints: No swelling.     Labs:    Results for SEAMUS NAVARRO (MRN [id number]) as of 12/10/2018 15:15   Ref. Range 11/2/2018 15:28 11/2/2018 15:40   Sodium Latest Ref Range: 135 - 145 mmol/L  142   Potassium Latest Ref Range: 3.6 - 5.5 mmol/L  3.1 (L)   Chloride Latest Ref Range: 96 - 112 mmol/L  108   Co2 Latest Ref Range: 20 - 33 mmol/L  23   Anion Gap Latest Ref Range: 0.0 - 11.9   11.0   Glucose Latest Ref Range: 65 - 99 mg/dL  126 (H)   Bun Latest Ref Range: 8 - 22 mg/dL  12   Creatinine Latest Ref Range: 0.50 - 1.40 mg/dL  0.81   GFR If  Latest Ref Range: >60 mL/min/1.73 m 2  >60   GFR If Non  Latest Ref Range: >60 mL/min/1.73 m 2  >60   Calcium Latest Ref Range: 8.5 - 10.5 mg/dL  9.6   AST(SGOT) Latest Ref Range: 12 - 45 U/L  20   ALT(SGPT) Latest Ref Range: 2 - 50 U/L  26   Alkaline Phosphatase Latest Ref Range: 30 - 99 U/L  49   Total Bilirubin Latest Ref Range: 0.1 - 1.5 mg/dL  0.6   Albumin Latest Ref Range: 3.2 - 4.9 g/dL  4.7   Total Protein Latest Ref Range: 6.0 - 8.2 g/dL  7.6   Globulin Latest Ref Range: 1.9 - 3.5 g/dL  2.9   A-G Ratio Latest Units: g/dL  1.6   Urobilinogen, Urine Latest Ref Range: Negative  0.2    Color Unknown Yellow    Character Unknown Clear    Specific Gravity Latest Ref Range: <1.035  1.020     Ph Latest Ref Range: 5.0 - 8.0  5.5    Glucose Latest Ref Range: Negative mg/dL Negative    Ketones Latest Ref Range: Negative mg/dL Trace (A)    Bilirubin Latest Ref Range: Negative  Negative    Occult Blood Latest Ref Range: Negative  Small (A)    Protein Latest Ref Range: Negative mg/dL Negative    Nitrite Latest Ref Range: Negative  Negative    Leukocyte Esterase Latest Ref Range: Negative  Negative    Micro Urine Req Unknown Microscopic    WBC Latest Units: /hpf 2-5    RBC Latest Units: /hpf 0-2    Epithelial Cells Latest Units: /hpf Negative    Bacteria Latest Ref Range: None /hpf Few (A)    Hyaline Cast Latest Units: /lpf 0-2    Vitamin B12 -True Cobalamin Latest Ref Range: 211 - 911 pg/mL  499   C3 Complement Latest Ref Range: 88 - 201 mg/dL  141   Complement C4 Latest Ref Range: 10 - 40 mg/dL  35   TSH Latest Ref Range: 0.380 - 5.330 uIU/mL  1.270   Rheumatoid Factor -Neph- Latest Ref Range: 0 - 14 IU/mL  <10   Antinuclear Antibody Latest Ref Range: None Detected   None Detected   SSA 52 (R0)(RIVER) Ab, IgG Latest Ref Range: 0 - 40 AU/mL  2   SSA 60 (R0)(RIVER) Ab, IgG Latest Ref Range: 0 - 40 AU/mL  11   Sjogren'S Anti-Ss-B Latest Ref Range: 0 - 40 AU/mL  10   SIDDHARTHA Reflex Unknown  Not Done   Interpretation Unknown  See Note   Total Protein Latest Ref Range: 6.00 - 8.30 g/dL  7.40   Albumin Latest Ref Range: 3.75 - 5.01 g/dL  4.53   Gamma Globulin Latest Ref Range: 0.62 - 1.51 g/dL  1.02   Alpha-1 Globulin Latest Ref Range: 0.19 - 0.46 g/dL  0.30   Alpha-2 Globulin Latest Ref Range: 0.48 - 1.05 g/dL  0.67   Beta Globulin Latest Ref Range: 0.48 - 1.10 g/dL  0.88   EER Serum Prot. Electro. Reflex Unknown  See Note       Imaging:   Today I reviewed the patient's most recent MRI images with her in the examination room. I explained basic terminology of MRI's, verbalized my assessment, and answered her questions.     MRI Brain w/wo contrast from 12/6/18  MRI of the brain without and with contrast within normal  limits.    MRI C-Spine w/wo contrast from 12/6/18  1. MRI OF THE CERVICAL SPINE WITHOUT AND WITH CONTRAST WITHIN NORMAL LIMITS. NO EVIDENCE OF DEMYELINATING DISEASE IN THE CERVICAL SPINAL CORD.       Assessment/Plan:  Elevated antinuclear antibody (KEVIN) level  Ms. Fish's recent Lupus profile was unremarkable. Her repeat KEVIN was negative. Sjogren's antibodies, complement, and Rheumatoid factor were within normal limits. I suggested she keep her Rheumatology appointment to discuss the significance of her previously elevated KEVIN.     Memory changes  Today we reviewed Ms. Fish's recent brain MRI which did not show any abnormalities to explain her memory changes. Additionally, there was no demyelination seen which rules out Multiple Sclerosis as a cause of her symptoms. The cause of her symptoms remains unclear, but her spontaneous improvement is reassuring.     Macular degeneration  Ms. Fish has not yet seen Dr. Mabry due to long wait times in his practice. I would still like his recommendations and will contact his office.     Hyperreflexia  Brain and C-Spine MRI were within normal limits. Her brisk reflexes are most likely physiologic, a normal variant which is sometimes seen but benign. I would not recommend any additional MRI imaging at this time.       Greater than 50% of this 40 minute face to face encounter was devoted to disease state counseling and coordination of care.  Please see above assessment and plan for discussion.       Elli Real D.O., M.P.H  MS specialist.   Board Certified Neurologist.  Neurology Clerkship Director, [facility].    Neurology,  [facility].   Tel: [phone]  Fax: [phone]

## 2018-12-24 PROBLEM — R29.2 HYPERREFLEXIA: Status: ACTIVE | Noted: 2018-12-24

## 2018-12-24 NOTE — ASSESSMENT & PLAN NOTE
Ms. Fish has not yet seen Dr. Mabry due to long wait times in his practice. I would still like his recommendations and will contact his office.

## 2018-12-24 NOTE — ASSESSMENT & PLAN NOTE
Brain and C-Spine MRI were within normal limits. Her brisk reflexes are most likely physiologic, a normal variant which is sometimes seen but benign. I would not recommend any additional MRI imaging at this time.

## 2018-12-24 NOTE — ASSESSMENT & PLAN NOTE
Ms. Fish's recent Lupus profile was unremarkable. Her repeat KEVIN was negative. Sjogren's antibodies, complement, and Rheumatoid factor were within normal limits. I suggested she keep her Rheumatology appointment to discuss the significance of her previously elevated KEVIN.

## 2018-12-24 NOTE — ASSESSMENT & PLAN NOTE
Today we reviewed Ms. Fish's recent brain MRI which did not show any abnormalities to explain her memory changes. Additionally, there was no demyelination seen which rules out Multiple Sclerosis as a cause of her symptoms. The cause of her symptoms remains unclear, but her spontaneous improvement is reassuring.

## 2019-02-21 ENCOUNTER — OFFICE VISIT (OUTPATIENT)
Dept: URGENT CARE | Facility: PHYSICIAN GROUP | Age: 34
End: 2019-02-21
Payer: COMMERCIAL

## 2019-02-21 ENCOUNTER — HOSPITAL ENCOUNTER (OUTPATIENT)
Dept: LAB | Facility: MEDICAL CENTER | Age: 34
End: 2019-02-21
Attending: INTERNAL MEDICINE
Payer: COMMERCIAL

## 2019-02-21 VITALS
OXYGEN SATURATION: 97 % | DIASTOLIC BLOOD PRESSURE: 80 MMHG | BODY MASS INDEX: 25.06 KG/M2 | SYSTOLIC BLOOD PRESSURE: 108 MMHG | TEMPERATURE: 99.1 F | HEIGHT: 72 IN | RESPIRATION RATE: 16 BRPM | HEART RATE: 96 BPM | WEIGHT: 185 LBS

## 2019-02-21 DIAGNOSIS — J01.40 ACUTE NON-RECURRENT PANSINUSITIS: ICD-10-CM

## 2019-02-21 LAB
CRP SERPL HS-MCNC: 0.22 MG/DL (ref 0–0.75)
ERYTHROCYTE [SEDIMENTATION RATE] IN BLOOD BY WESTERGREN METHOD: 14 MM/HOUR (ref 0–20)
THYROPEROXIDASE AB SERPL-ACNC: <0.2 IU/ML (ref 0–9)

## 2019-02-21 PROCEDURE — 86235 NUCLEAR ANTIGEN ANTIBODY: CPT | Mod: 91

## 2019-02-21 PROCEDURE — 86140 C-REACTIVE PROTEIN: CPT

## 2019-02-21 PROCEDURE — 85730 THROMBOPLASTIN TIME PARTIAL: CPT

## 2019-02-21 PROCEDURE — 86812 HLA TYPING A B OR C: CPT

## 2019-02-21 PROCEDURE — 86215 DEOXYRIBONUCLEASE ANTIBODY: CPT

## 2019-02-21 PROCEDURE — 85520 HEPARIN ASSAY: CPT

## 2019-02-21 PROCEDURE — 86146 BETA-2 GLYCOPROTEIN ANTIBODY: CPT

## 2019-02-21 PROCEDURE — 85652 RBC SED RATE AUTOMATED: CPT

## 2019-02-21 PROCEDURE — 86200 CCP ANTIBODY: CPT

## 2019-02-21 PROCEDURE — 86148 ANTI-PHOSPHOLIPID ANTIBODY: CPT

## 2019-02-21 PROCEDURE — 86376 MICROSOMAL ANTIBODY EACH: CPT

## 2019-02-21 PROCEDURE — 36415 COLL VENOUS BLD VENIPUNCTURE: CPT

## 2019-02-21 PROCEDURE — 85613 RUSSELL VIPER VENOM DILUTED: CPT

## 2019-02-21 PROCEDURE — 99204 OFFICE O/P NEW MOD 45 MIN: CPT | Performed by: FAMILY MEDICINE

## 2019-02-21 PROCEDURE — 85610 PROTHROMBIN TIME: CPT

## 2019-02-21 PROCEDURE — 86147 CARDIOLIPIN ANTIBODY EA IG: CPT | Mod: 91

## 2019-02-21 PROCEDURE — 86038 ANTINUCLEAR ANTIBODIES: CPT

## 2019-02-21 RX ORDER — AMOXICILLIN AND CLAVULANATE POTASSIUM 875; 125 MG/1; MG/1
1 TABLET, FILM COATED ORAL 2 TIMES DAILY
Qty: 14 TAB | Refills: 0 | Status: SHIPPED | OUTPATIENT
Start: 2019-02-21 | End: 2019-02-28

## 2019-02-21 ASSESSMENT — ENCOUNTER SYMPTOMS
FEVER: 0
SWOLLEN GLANDS: 0
COUGH: 1
SHORTNESS OF BREATH: 0
EYE PAIN: 0
SINUS PRESSURE: 1
VOMITING: 0
SORE THROAT: 0
DIZZINESS: 0
CHILLS: 0
MYALGIAS: 0
HEADACHES: 1
NAUSEA: 0

## 2019-02-21 NOTE — PROGRESS NOTES
Subjective:     Benito Fish is a 33 y.o. female who presents for Sinus Problem       Sinus Problem   This is a new problem. The current episode started 1 to 4 weeks ago. The problem has been rapidly worsening since onset. The pain is moderate. Associated symptoms include congestion, coughing, headaches and sinus pressure. Pertinent negatives include no chills, shortness of breath, sore throat or swollen glands.     Past Medical History:   Diagnosis Date   • Anesthesia     took a really long time to wake up (3 days)   • Cold 8/2016    severe hayfever   • Depression    • Hemorrhagic disorder (HCC)     nose bleeds from allergies, flonase   • Thyroid disease      Past Surgical History:   Procedure Laterality Date   • THYROIDECTOMY TOTAL  9/9/2016    Procedure: RIGHT THYROIDECTOMY  UNILATERAL, NIMS RECURRENT LARYNGEAL NERVE MONITORING;  Surgeon: Nita Sage M.D.;  Location: SURGERY [facility];  Service:    • OTHER ABDOMINAL SURGERY  4/2009    appy   • OTHER ORTHOPEDIC SURGERY  2005    ankle repair left, ligament repair   • APPENDECTOMY       Social History     Social History   • Marital status:      Spouse name: N/A   • Number of children: N/A   • Years of education: N/A     Occupational History   • Not on file.     Social History Main Topics   • Smoking status: Never Smoker   • Smokeless tobacco: Never Used   • Alcohol use Yes      Comment: 1 per week   • Drug use: No   • Sexual activity: Yes     Partners: Female     Other Topics Concern   • Not on file     Social History Narrative   • No narrative on file      Family History   Problem Relation Age of Onset   • Thyroid Mother    • Seizures Father    • Hyperlipidemia Father    • Hypertension Father    • Thyroid Sister    • Alcohol/Drug Brother    • Rheumatologic Disease Maternal Grandmother         RA   • Alcohol/Drug Sister     Review of Systems   Constitutional: Negative for chills and fever.   HENT: Positive for congestion and sinus pressure.  Negative for sore throat.    Eyes: Negative for pain.   Respiratory: Positive for cough. Negative for shortness of breath.    Cardiovascular: Negative for chest pain.   Gastrointestinal: Negative for nausea and vomiting.   Genitourinary: Negative for hematuria.   Musculoskeletal: Negative for myalgias.   Skin: Negative for rash.   Neurological: Positive for headaches. Negative for dizziness.     Allergies   Allergen Reactions   • Codeine Vomiting     [id number] 8 to 9 hours of vomiting   • Other Environmental      Severe hayfever      Objective:   /80 (BP Location: Left arm, Patient Position: Sitting, BP Cuff Size: Adult)   Pulse 96   Temp 37.3 °C (99.1 °F) (Temporal)   Resp 16   Ht 1.829 m (6')   Wt 83.9 kg (185 lb)   SpO2 97%   BMI 25.09 kg/m²   Physical Exam   Constitutional: She is oriented to person, place, and time. She appears well-developed and well-nourished. No distress.   HENT:   Head: Normocephalic and atraumatic.   Nose: Mucosal edema and rhinorrhea present. Right sinus exhibits frontal sinus tenderness. Right sinus exhibits no maxillary sinus tenderness. Left sinus exhibits frontal sinus tenderness. Left sinus exhibits no maxillary sinus tenderness.   Eyes: Pupils are equal, round, and reactive to light. Conjunctivae and EOM are normal.   Cardiovascular: Normal rate, regular rhythm, normal heart sounds and intact distal pulses.    No murmur heard.  Pulmonary/Chest: Effort normal and breath sounds normal. No respiratory distress.   Abdominal: Soft. She exhibits no distension. There is no tenderness.   Musculoskeletal: Normal range of motion.   Neurological: She is alert and oriented to person, place, and time. She has normal reflexes. No sensory deficit.   Skin: Skin is warm and dry.   Psychiatric: She has a normal mood and affect. Thought content normal.         Assessment/Plan:   Assessment    1. Acute non-recurrent pansinusitis  - amoxicillin-clavulanate (AUGMENTIN) 875-125 MG Tab; Take 1  Tab by mouth 2 times a day for 7 days.  Dispense: 14 Tab; Refill: 0    Differential diagnosis, natural history, supportive care, and indications for immediate follow-up discussed.

## 2019-02-21 NOTE — LETTER
February 21, 2019         Patient: Benito Fish   YOB: 1985   Date of Visit: 2/21/2019           To Whom it May Concern:    Benito Fish was seen in my clinic on 2/21/2019. She may return to work on 2/23/2019..    If you have any questions or concerns, please don't hesitate to call.        Sincerely,           Suraj Fernandes M.D.  Electronically Signed

## 2019-02-21 NOTE — PATIENT INSTRUCTIONS

## 2019-02-22 LAB
APTT PPP: 27.1 SEC (ref 24.7–36)
DRVVT MIX 37 DRVMX37: 43.5 SEC (ref 28–48)
DRVVT MIX IMMEDIATE DRVMXI: 43.4 SEC (ref 28–48)
INR PPP: 1.06 (ref 0.87–1.13)
LA PPP-IMP: ABNORMAL
LA PPP-IMP: NORMAL
PROTHROMBIN TIME: 14 SEC (ref 12–14.6)
SCREEN DRVVT: 49.1 SEC (ref 28–48)
UFH PPP CHRO-ACNC: <0.1 U/ML

## 2019-02-23 LAB
B2 GLYCOPROT1 IGA SER-ACNC: 3 SAU (ref 0–20)
B2 GLYCOPROT1 IGG SERPL IA-ACNC: 0 SGU (ref 0–20)
B2 GLYCOPROT1 IGM SERPL IA-ACNC: 3 SMU (ref 0–20)
CARDIOLIPIN IGA SER IA-ACNC: 0 APL (ref 0–11)
CARDIOLIPIN IGG SER IA-ACNC: 0 GPL (ref 0–14)
CARDIOLIPIN IGM SER IA-ACNC: 8 MPL (ref 0–12)
CCP IGG SERPL-ACNC: 3 UNITS (ref 0–19)
ENA SM IGG SER-ACNC: 0 AU/ML (ref 0–40)
HLA-B27 QL FC: NEGATIVE
NUCLEAR IGG SER QL IA: NORMAL
STREP DNASE B TITR SER: 92 U/ML (ref 0–260)
U1 SNRNP IGG SER QL: 0 AU/ML (ref 0–40)

## 2019-02-26 LAB
PS IGA SER IA-ACNC: 1 U/ML (ref 0–19)
PS IGG SER IA-ACNC: 4 U/ML (ref 0–10)
PS IGM SER IA-ACNC: 11 U/ML (ref 0–24)

## 2019-02-27 ENCOUNTER — OFFICE VISIT (OUTPATIENT)
Dept: URGENT CARE | Facility: PHYSICIAN GROUP | Age: 34
End: 2019-02-27
Payer: COMMERCIAL

## 2019-02-27 VITALS
WEIGHT: 185 LBS | OXYGEN SATURATION: 98 % | HEART RATE: 92 BPM | SYSTOLIC BLOOD PRESSURE: 124 MMHG | DIASTOLIC BLOOD PRESSURE: 82 MMHG | HEIGHT: 72 IN | BODY MASS INDEX: 25.06 KG/M2 | RESPIRATION RATE: 14 BRPM | TEMPERATURE: 97.6 F

## 2019-02-27 DIAGNOSIS — H69.93 DYSFUNCTION OF BOTH EUSTACHIAN TUBES: ICD-10-CM

## 2019-02-27 DIAGNOSIS — J01.40 ACUTE NON-RECURRENT PANSINUSITIS: ICD-10-CM

## 2019-02-27 PROCEDURE — 99214 OFFICE O/P EST MOD 30 MIN: CPT | Performed by: PHYSICIAN ASSISTANT

## 2019-02-27 RX ORDER — DOXYCYCLINE HYCLATE 100 MG
100 TABLET ORAL 2 TIMES DAILY
Qty: 20 TAB | Refills: 0 | Status: SHIPPED | OUTPATIENT
Start: 2019-02-27 | End: 2019-03-09

## 2019-02-27 RX ORDER — METHYLPREDNISOLONE 4 MG/1
4 TABLET ORAL SEE ADMIN INSTRUCTIONS
Qty: 21 TAB | Refills: 0 | Status: SHIPPED | OUTPATIENT
Start: 2019-02-27 | End: 2021-12-15

## 2019-02-27 NOTE — PROGRESS NOTES
Chief Complaint   Patient presents with   • Sinus Problem       HISTORY OF PRESENT ILLNESS: Patient is a 33 y.o. female who presents today because she has been on antibiotics for a week for a sinus infection he has not had any improvement.  She has also been using over-the-counter multisymptom sinus and cold medication without improvement.  She feels worse than when she initially came in a week ago.    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 21 Tab 0   • doxycycline (VIBRAMYCIN) 100 MG Tab Take 1 Tab by mouth 2 times a day for 10 days. 20 Tab 0   • amoxicillin-clavulanate (AUGMENTIN) 875-125 MG Tab Take 1 Tab by mouth 2 times a day for 7 days. 14 Tab 0   • Multiple Vitamins-Minerals (ONE DAILY FOR WOMEN PO) Take  by mouth.     • Multiple Vitamins-Minerals (OCUVITE-LUTEIN) Tab Take 1 tablet by mouth every day.     • Doxylamine Succinate, Sleep, (UNISOM PO) Take  by mouth.     • levothyroxine (SYNTHROID) 50 MCG Tab Take 50 mcg by mouth Every morning on an empty stomach.     • carisoprodol (SOMA) 350 MG Tab Take 350 mg by mouth every bedtime.     • trazodone (DESYREL) 50 MG Tab Take 50 mg by mouth every bedtime.     • fluticasone (FLONASE) 50 MCG/ACT nasal spray Spray 1 Spray in nose 1 time daily as needed.     • diphenhydrAMINE (BENADRYL) 25 MG Tab Take 50 mg by mouth every bedtime.     • CALCIUM-MAGNESIUM-ZINC PO Take 1 Tab by mouth every day.     • NON SPECIFIED Take 1 Tab by mouth every day. Raw   One =multivitamin, daily     • Snhzkzrtu-SFU-LK-APAP (MARGARETH-SELTZER PLUS COLD & FLU PO) Take 2 Each by mouth at bedtime as needed. Gets cold symptoms from allergies       No current Epic-ordered facility-administered medications on file.        Past Medical History:   Diagnosis Date   • Anesthesia     took a really long time to wake up (3 days)   • Cold 2016    severe hayfever   • Depression    • Hemorrhagic disorder (HCC)     nose bleeds from allergies, flonase   • Thyroid disease        Social History   Substance Use Topics   • Smoking status: Never Smoker   • Smokeless tobacco: Never Used   • Alcohol use Yes      Comment: 1 per week       Family Status   Relation Status   • Mo Alive   • Fa Alive   • Sis Alive   • Bro Alive   • MGMo    • Sis Alive     Family History   Problem Relation Age of Onset   • Thyroid Mother    • Seizures Father    • Hyperlipidemia Father    • Hypertension Father    • Thyroid Sister    • Alcohol/Drug Brother    • Rheumatologic Disease Maternal Grandmother         RA   • Alcohol/Drug Sister        ROS:  Review of Systems   Constitutional: Negative for fever, chills, weight loss and malaise/fatigue.   HENT: Positive for bilateral ear pain, no nosebleeds, positive for nasal and sinus congestion, sore throat and neck pain.    Eyes: Negative for blurred vision.   Respiratory: Positive for minimal cough, no sputum production, shortness of breath and wheezing.    Cardiovascular: Negative for chest pain, palpitations, orthopnea and leg swelling.   Gastrointestinal: Negative for heartburn, nausea, vomiting and abdominal pain.   Genitourinary: Negative for dysuria, urgency and frequency.     Exam:  Blood pressure 124/82, pulse 92, temperature 36.4 °C (97.6 °F), temperature source Temporal, resp. rate 14, height 1.829 m (6'), weight 83.9 kg (185 lb), SpO2 98 %.  General:  Well nourished, well developed female in NAD  Head:Normocephalic, atraumatic  Eyes: PERRLA, EOM within normal limits, no  conjunctival injection, no scleral icterus, visual fields and acuity grossly intact.  Ears: Normal shape and symmetry, no tenderness, no discharge. External canals are without any significant edema or erythema. Tympanic membranes are without any inflammation, small amount of cloudy fluid behind the tympanic membranes bilaterally, retracted bilaterally gross auditory acuity is intact  Nose: Symmetrical without tenderness, no discharge.  Nasal mucosa on the left is erythematous and there is posterior nasal cavity exudate  Mouth: reasonable hygiene, no erythema exudates or tonsillar enlargement.  Neck: no masses, range of motion within normal limits, no tracheal deviation. No obvious thyroid enlargement.  Pulmonary: chest is symmetrical with respiration, no wheezes, crackles, or rhonchi.  Cardiovascular: regular rate and rhythm without murmurs, rubs, or gallops.  Extremities: no clubbing, cyanosis, or edema.    Please note that this dictation was created using voice recognition software. I have made every reasonable attempt to correct obvious errors, but I expect that there are errors of grammar and possibly content that I did not discover before finalizing the note.    Assessment/Plan:  1. Acute non-recurrent pansinusitis  doxycycline (VIBRAMYCIN) 100 MG Tab   2. Dysfunction of both eustachian tubes  MethylPREDNISolone (MEDROL DOSEPAK) 4 MG Tablet Therapy Pack   May continue symptomatic relief as tolerated    Followup with primary care in the next 7-10 days if not significantly improving, return to the urgent care or go to the emergency room sooner for any worsening of symptoms.

## 2019-05-07 ENCOUNTER — HOSPITAL ENCOUNTER (OUTPATIENT)
Dept: LAB | Facility: MEDICAL CENTER | Age: 34
End: 2019-05-07
Attending: PSYCHIATRY & NEUROLOGY
Payer: COMMERCIAL

## 2019-05-07 LAB
ALBUMIN SERPL BCP-MCNC: 4.2 G/DL (ref 3.2–4.9)
ALBUMIN/GLOB SERPL: 1.4 G/DL
ALP SERPL-CCNC: 40 U/L (ref 30–99)
ALT SERPL-CCNC: 15 U/L (ref 2–50)
ANION GAP SERPL CALC-SCNC: 9 MMOL/L (ref 0–11.9)
AST SERPL-CCNC: 14 U/L (ref 12–45)
BASOPHILS # BLD AUTO: 0.9 % (ref 0–1.8)
BASOPHILS # BLD: 0.05 K/UL (ref 0–0.12)
BILIRUB SERPL-MCNC: 0.6 MG/DL (ref 0.1–1.5)
BUN SERPL-MCNC: 15 MG/DL (ref 8–22)
CALCIUM SERPL-MCNC: 9.6 MG/DL (ref 8.5–10.5)
CHLORIDE SERPL-SCNC: 107 MMOL/L (ref 96–112)
CO2 SERPL-SCNC: 24 MMOL/L (ref 20–33)
CREAT SERPL-MCNC: 0.78 MG/DL (ref 0.5–1.4)
CRP SERPL HS-MCNC: 0.06 MG/DL (ref 0–0.75)
EOSINOPHIL # BLD AUTO: 0.03 K/UL (ref 0–0.51)
EOSINOPHIL NFR BLD: 0.6 % (ref 0–6.9)
ERYTHROCYTE [DISTWIDTH] IN BLOOD BY AUTOMATED COUNT: 39.7 FL (ref 35.9–50)
ERYTHROCYTE [SEDIMENTATION RATE] IN BLOOD BY WESTERGREN METHOD: 5 MM/HOUR (ref 0–20)
GLOBULIN SER CALC-MCNC: 3 G/DL (ref 1.9–3.5)
GLUCOSE SERPL-MCNC: 98 MG/DL (ref 65–99)
HCT VFR BLD AUTO: 46.1 % (ref 37–47)
HGB BLD-MCNC: 16 G/DL (ref 12–16)
IMM GRANULOCYTES # BLD AUTO: 0.02 K/UL (ref 0–0.11)
IMM GRANULOCYTES NFR BLD AUTO: 0.4 % (ref 0–0.9)
LYMPHOCYTES # BLD AUTO: 1.36 K/UL (ref 1–4.8)
LYMPHOCYTES NFR BLD: 25.4 % (ref 22–41)
MCH RBC QN AUTO: 31.6 PG (ref 27–33)
MCHC RBC AUTO-ENTMCNC: 34.7 G/DL (ref 33.6–35)
MCV RBC AUTO: 90.9 FL (ref 81.4–97.8)
MONOCYTES # BLD AUTO: 0.41 K/UL (ref 0–0.85)
MONOCYTES NFR BLD AUTO: 7.6 % (ref 0–13.4)
NEUTROPHILS # BLD AUTO: 3.49 K/UL (ref 2–7.15)
NEUTROPHILS NFR BLD: 65.1 % (ref 44–72)
NRBC # BLD AUTO: 0 K/UL
NRBC BLD-RTO: 0 /100 WBC
PLATELET # BLD AUTO: 259 K/UL (ref 164–446)
PMV BLD AUTO: 10.7 FL (ref 9–12.9)
POTASSIUM SERPL-SCNC: 4 MMOL/L (ref 3.6–5.5)
PROT SERPL-MCNC: 7.2 G/DL (ref 6–8.2)
RBC # BLD AUTO: 5.07 M/UL (ref 4.2–5.4)
SODIUM SERPL-SCNC: 140 MMOL/L (ref 135–145)
WBC # BLD AUTO: 5.4 K/UL (ref 4.8–10.8)

## 2019-05-07 PROCEDURE — 85652 RBC SED RATE AUTOMATED: CPT

## 2019-05-07 PROCEDURE — 86140 C-REACTIVE PROTEIN: CPT

## 2019-05-07 PROCEDURE — 36415 COLL VENOUS BLD VENIPUNCTURE: CPT

## 2019-05-07 PROCEDURE — 86038 ANTINUCLEAR ANTIBODIES: CPT

## 2019-05-07 PROCEDURE — 80053 COMPREHEN METABOLIC PANEL: CPT

## 2019-05-07 PROCEDURE — 85025 COMPLETE CBC W/AUTO DIFF WBC: CPT

## 2019-05-09 LAB — NUCLEAR IGG SER QL IA: NORMAL

## 2019-07-23 ENCOUNTER — HOSPITAL ENCOUNTER (OUTPATIENT)
Dept: RADIOLOGY | Facility: MEDICAL CENTER | Age: 34
End: 2019-07-23
Attending: PSYCHIATRY & NEUROLOGY
Payer: COMMERCIAL

## 2019-07-23 DIAGNOSIS — H35.51 VITREORETINAL DYSTROPHY: ICD-10-CM

## 2019-07-23 DIAGNOSIS — H46.8 OTHER OPTIC NEURITIS: ICD-10-CM

## 2019-07-23 PROCEDURE — 700117 HCHG RX CONTRAST REV CODE 255: Performed by: PSYCHIATRY & NEUROLOGY

## 2019-07-23 PROCEDURE — A9585 GADOBUTROL INJECTION: HCPCS | Performed by: PSYCHIATRY & NEUROLOGY

## 2019-07-23 PROCEDURE — 70543 MRI ORBT/FAC/NCK W/O &W/DYE: CPT

## 2019-07-23 RX ORDER — GADOBUTROL 604.72 MG/ML
8 INJECTION INTRAVENOUS ONCE
Status: COMPLETED | OUTPATIENT
Start: 2019-07-23 | End: 2019-07-23

## 2019-07-23 RX ADMIN — GADOBUTROL 8 ML: 604.72 INJECTION INTRAVENOUS at 15:30

## 2020-01-24 ENCOUNTER — NON-PROVIDER VISIT (OUTPATIENT)
Dept: URGENT CARE | Facility: PHYSICIAN GROUP | Age: 35
End: 2020-01-24

## 2020-01-24 DIAGNOSIS — Z11.1 PPD SCREENING TEST: ICD-10-CM

## 2020-01-24 PROCEDURE — 86580 TB INTRADERMAL TEST: CPT | Performed by: PHYSICIAN ASSISTANT

## 2020-01-27 ENCOUNTER — NON-PROVIDER VISIT (OUTPATIENT)
Dept: URGENT CARE | Facility: PHYSICIAN GROUP | Age: 35
End: 2020-01-27

## 2020-01-27 DIAGNOSIS — Z11.1 PPD SCREENING TEST: ICD-10-CM

## 2020-01-27 LAB — TB WHEAL 3D P 5 TU DIAM: NEGATIVE MM

## 2020-04-13 ENCOUNTER — NON-PROVIDER VISIT (OUTPATIENT)
Dept: URGENT CARE | Facility: PHYSICIAN GROUP | Age: 35
End: 2020-04-13

## 2020-04-13 DIAGNOSIS — Z02.1 PRE-EMPLOYMENT DRUG SCREENING: ICD-10-CM

## 2020-04-13 LAB
AMP AMPHETAMINE: NORMAL
COC COCAINE: NORMAL
INT CON NEG: NORMAL
INT CON POS: NORMAL
MET METHAMPHETAMINES: NORMAL
OPI OPIATES: NORMAL
PCP PHENCYCLIDINE: NORMAL
POC DRUG COMMENT 753798-OCCUPATIONAL HEALTH: NEGATIVE
THC: NORMAL

## 2020-04-13 PROCEDURE — 80305 DRUG TEST PRSMV DIR OPT OBS: CPT | Performed by: FAMILY MEDICINE

## 2020-10-06 ENCOUNTER — APPOINTMENT (RX ONLY)
Dept: URBAN - NONMETROPOLITAN AREA CLINIC 15 | Facility: CLINIC | Age: 35
Setting detail: DERMATOLOGY
End: 2020-10-06

## 2020-10-06 DIAGNOSIS — L81.4 OTHER MELANIN HYPERPIGMENTATION: ICD-10-CM

## 2020-10-06 DIAGNOSIS — L72.11 PILAR CYST: ICD-10-CM

## 2020-10-06 DIAGNOSIS — L91.0 HYPERTROPHIC SCAR: ICD-10-CM

## 2020-10-06 DIAGNOSIS — Z71.89 OTHER SPECIFIED COUNSELING: ICD-10-CM

## 2020-10-06 DIAGNOSIS — L21.8 OTHER SEBORRHEIC DERMATITIS: ICD-10-CM

## 2020-10-06 DIAGNOSIS — D22 MELANOCYTIC NEVI: ICD-10-CM

## 2020-10-06 PROBLEM — D22.71 MELANOCYTIC NEVI OF RIGHT LOWER LIMB, INCLUDING HIP: Status: ACTIVE | Noted: 2020-10-06

## 2020-10-06 PROBLEM — D22.62 MELANOCYTIC NEVI OF LEFT UPPER LIMB, INCLUDING SHOULDER: Status: ACTIVE | Noted: 2020-10-06

## 2020-10-06 PROBLEM — D22.61 MELANOCYTIC NEVI OF RIGHT UPPER LIMB, INCLUDING SHOULDER: Status: ACTIVE | Noted: 2020-10-06

## 2020-10-06 PROBLEM — D22.5 MELANOCYTIC NEVI OF TRUNK: Status: ACTIVE | Noted: 2020-10-06

## 2020-10-06 PROBLEM — D22.72 MELANOCYTIC NEVI OF LEFT LOWER LIMB, INCLUDING HIP: Status: ACTIVE | Noted: 2020-10-06

## 2020-10-06 PROCEDURE — ? LIQUID NITROGEN

## 2020-10-06 PROCEDURE — ? PRESCRIPTION

## 2020-10-06 PROCEDURE — 99203 OFFICE O/P NEW LOW 30 MIN: CPT | Mod: 25

## 2020-10-06 PROCEDURE — ? COUNSELING

## 2020-10-06 PROCEDURE — 11900 INJECT SKIN LESIONS </W 7: CPT | Mod: 59

## 2020-10-06 PROCEDURE — 17110 DESTRUCTION B9 LES UP TO 14: CPT

## 2020-10-06 PROCEDURE — ? INTRALESIONAL KENALOG

## 2020-10-06 RX ORDER — CLOBETASOL PROPIONATE 0.5 MG/ML
SOLUTION TOPICAL
Qty: 1 | Refills: 3 | Status: ERX | COMMUNITY
Start: 2020-10-06

## 2020-10-06 RX ADMIN — CLOBETASOL PROPIONATE: 0.5 SOLUTION TOPICAL at 00:00

## 2020-10-06 ASSESSMENT — LOCATION SIMPLE DESCRIPTION DERM
LOCATION SIMPLE: LEFT UPPER ARM
LOCATION SIMPLE: RIGHT PRETIBIAL REGION
LOCATION SIMPLE: RIGHT CHEEK
LOCATION SIMPLE: LEFT POPLITEAL SKIN
LOCATION SIMPLE: RIGHT UPPER BACK
LOCATION SIMPLE: LEFT BREAST
LOCATION SIMPLE: RIGHT UPPER ARM
LOCATION SIMPLE: SCALP
LOCATION SIMPLE: ABDOMEN
LOCATION SIMPLE: ANTERIOR SCALP
LOCATION SIMPLE: CHEST
LOCATION SIMPLE: LEFT UPPER BACK
LOCATION SIMPLE: UPPER BACK
LOCATION SIMPLE: LEFT PRETIBIAL REGION
LOCATION SIMPLE: RIGHT POPLITEAL SKIN

## 2020-10-06 ASSESSMENT — LOCATION ZONE DERM
LOCATION ZONE: TRUNK
LOCATION ZONE: SCALP
LOCATION ZONE: ARM
LOCATION ZONE: LEG
LOCATION ZONE: FACE

## 2020-10-06 ASSESSMENT — LOCATION DETAILED DESCRIPTION DERM
LOCATION DETAILED: LEFT ANTERIOR DISTAL UPPER ARM
LOCATION DETAILED: RIGHT POPLITEAL SKIN
LOCATION DETAILED: RIGHT ANTERIOR DISTAL UPPER ARM
LOCATION DETAILED: LEFT LATERAL PROXIMAL PRETIBIAL REGION
LOCATION DETAILED: MID-FRONTAL SCALP
LOCATION DETAILED: RIGHT PROXIMAL PRETIBIAL REGION
LOCATION DETAILED: RIGHT MEDIAL SUPERIOR CHEST
LOCATION DETAILED: RIGHT SUPERIOR UPPER BACK
LOCATION DETAILED: RIGHT ANTECUBITAL SKIN
LOCATION DETAILED: LEFT POPLITEAL SKIN
LOCATION DETAILED: LEFT INFERIOR MEDIAL UPPER BACK
LOCATION DETAILED: LEFT MEDIAL BREAST 10-11:00 REGION
LOCATION DETAILED: RIGHT INFERIOR CENTRAL MALAR CHEEK
LOCATION DETAILED: SUBXIPHOID
LOCATION DETAILED: INFERIOR THORACIC SPINE
LOCATION DETAILED: RIGHT SUPERIOR PARIETAL SCALP

## 2020-10-06 NOTE — PROCEDURE: INTRALESIONAL KENALOG
X Size Of Lesion In Cm (Optional): 0
Medical Necessity Clause: This procedure was medically necessary because the lesions that were treated were:
Include Z78.9 (Other Specified Conditions Influencing Health Status) As An Associated Diagnosis?: No
Lot # For Kenalog (Optional): [id number]
Concentration Of Kenalog Solution Injected (Mg/Ml): 40.0
Consent: The risks of atrophy were reviewed with the patient.
Detail Level: Detailed
Expiration Date For Kenalog (Optional): 06/21
Treatment Number (Optional): 1
Total Volume (Ccs): 0.4
Administered By (Optional): Corrie Perez
Kenalog Preparation: Kenalog

## 2020-11-25 ENCOUNTER — APPOINTMENT (RX ONLY)
Dept: URBAN - NONMETROPOLITAN AREA CLINIC 15 | Facility: CLINIC | Age: 35
Setting detail: DERMATOLOGY
End: 2020-11-25

## 2020-11-25 DIAGNOSIS — L72.8 OTHER FOLLICULAR CYSTS OF THE SKIN AND SUBCUTANEOUS TISSUE: ICD-10-CM

## 2020-11-25 PROBLEM — D48.5 NEOPLASM OF UNCERTAIN BEHAVIOR OF SKIN: Status: ACTIVE | Noted: 2020-11-25

## 2020-11-25 PROCEDURE — ? BIOPSY BY PUNCH METHOD

## 2020-11-25 PROCEDURE — 11104 PUNCH BX SKIN SINGLE LESION: CPT

## 2020-11-25 ASSESSMENT — LOCATION ZONE DERM: LOCATION ZONE: TRUNK

## 2020-11-25 ASSESSMENT — LOCATION DETAILED DESCRIPTION DERM: LOCATION DETAILED: LEFT MEDIAL BREAST 7-8:00 REGION

## 2020-11-25 ASSESSMENT — LOCATION SIMPLE DESCRIPTION DERM: LOCATION SIMPLE: LEFT BREAST

## 2020-11-25 NOTE — PROCEDURE: BIOPSY BY PUNCH METHOD
Detail Level: Detailed
Was A Bandage Applied: Yes
Punch Size In Mm: 6
Biopsy Type: H and E
Anesthesia Type: 1% lidocaine with epinephrine
Anesthesia Volume In Cc: 0.5
Additional Anesthesia Volume In Cc (Will Not Render If 0): 0
Hemostasis: None
Epidermal Sutures: 4-0 Polysorb
Number Of Epidermal Sutures (Optional): 2
Wound Care: Vaseline
Dressing: bandage
Suture Removal: 14 days
Lab: [id number]
Lab Facility: 
Render Path Notes In Note?: No
Consent: Verbal consent was obtained and risks were reviewed including but not limited to scarring, infection, bleeding, scabbing, incomplete removal, nerve damage and allergy to anesthesia.
Post-Care Instructions: I reviewed with the patient in detail post-care instructions. Patient is to keep the biopsy site dry overnight, and then apply bacitracin twice daily until healed. Patient may apply hydrogen peroxide soaks to remove any crusting.
Home Suture Removal Text: Patient was provided a home suture removal kit and will remove their sutures at home.  If they have any questions or difficulties they will call the office.
Notification Instructions: Patient will be notified of biopsy results. However, patient instructed to call the office if not contacted within 2 weeks.
Billing Type: Third-Party Bill
Information: Selecting Yes will display possible errors in your note based on the variables you have selected. This validation is only offered as a suggestion for you. PLEASE NOTE THAT THE VALIDATION TEXT WILL BE REMOVED WHEN YOU FINALIZE YOUR NOTE. IF YOU WANT TO FAX A PRELIMINARY NOTE YOU WILL NEED TO TOGGLE THIS TO 'NO' IF YOU DO NOT WANT IT IN YOUR FAXED NOTE.

## 2021-01-12 ENCOUNTER — HOSPITAL ENCOUNTER (OUTPATIENT)
Dept: LAB | Facility: MEDICAL CENTER | Age: 36
End: 2021-01-12
Attending: FAMILY MEDICINE
Payer: COMMERCIAL

## 2021-01-12 LAB
ALBUMIN SERPL BCP-MCNC: 4.4 G/DL (ref 3.2–4.9)
ALBUMIN/GLOB SERPL: 1.6 G/DL
ALP SERPL-CCNC: 55 U/L (ref 30–99)
ALT SERPL-CCNC: 19 U/L (ref 2–50)
ANION GAP SERPL CALC-SCNC: 15 MMOL/L (ref 7–16)
AST SERPL-CCNC: 20 U/L (ref 12–45)
BILIRUB SERPL-MCNC: 0.4 MG/DL (ref 0.1–1.5)
BUN SERPL-MCNC: 19 MG/DL (ref 8–22)
CALCIUM SERPL-MCNC: 8.9 MG/DL (ref 8.5–10.5)
CHLORIDE SERPL-SCNC: 105 MMOL/L (ref 96–112)
CO2 SERPL-SCNC: 19 MMOL/L (ref 20–33)
CREAT SERPL-MCNC: 0.71 MG/DL (ref 0.5–1.4)
CRP SERPL HS-MCNC: 0.09 MG/DL (ref 0–0.75)
ERYTHROCYTE [SEDIMENTATION RATE] IN BLOOD BY WESTERGREN METHOD: 4 MM/HOUR (ref 0–20)
GLOBULIN SER CALC-MCNC: 2.8 G/DL (ref 1.9–3.5)
GLUCOSE SERPL-MCNC: 97 MG/DL (ref 65–99)
HAV IGM SERPL QL IA: NORMAL
HBV CORE IGM SER QL: NORMAL
HBV SURFACE AG SER QL: NORMAL
HCV AB SER QL: NORMAL
POTASSIUM SERPL-SCNC: 3.6 MMOL/L (ref 3.6–5.5)
PROT SERPL-MCNC: 7.2 G/DL (ref 6–8.2)
RHEUMATOID FACT SER IA-ACNC: <10 IU/ML (ref 0–14)
SODIUM SERPL-SCNC: 139 MMOL/L (ref 135–145)

## 2021-01-12 PROCEDURE — 80074 ACUTE HEPATITIS PANEL: CPT

## 2021-01-12 PROCEDURE — 85730 THROMBOPLASTIN TIME PARTIAL: CPT

## 2021-01-12 PROCEDURE — 85610 PROTHROMBIN TIME: CPT

## 2021-01-12 PROCEDURE — 86038 ANTINUCLEAR ANTIBODIES: CPT

## 2021-01-12 PROCEDURE — 85520 HEPARIN ASSAY: CPT

## 2021-01-12 PROCEDURE — 85613 RUSSELL VIPER VENOM DILUTED: CPT

## 2021-01-12 PROCEDURE — 80053 COMPREHEN METABOLIC PANEL: CPT

## 2021-01-12 PROCEDURE — 86140 C-REACTIVE PROTEIN: CPT

## 2021-01-12 PROCEDURE — 36415 COLL VENOUS BLD VENIPUNCTURE: CPT

## 2021-01-12 PROCEDURE — 86431 RHEUMATOID FACTOR QUANT: CPT

## 2021-01-12 PROCEDURE — 85652 RBC SED RATE AUTOMATED: CPT

## 2021-01-12 PROCEDURE — 86235 NUCLEAR ANTIGEN ANTIBODY: CPT

## 2021-01-13 LAB
APTT PPP: 28.2 SEC (ref 24.7–36)
INR PPP: 0.95 (ref 0.87–1.13)
LA PPP-IMP: NORMAL
PROTHROMBIN TIME: 13 SEC (ref 12–14.6)
SCREEN DRVVT: 42.5 SEC (ref 28–48)
UFH PPP CHRO-ACNC: <0.1 U/ML

## 2021-01-14 LAB
ENA SM IGG SER-ACNC: 0 AU/ML (ref 0–40)
NUCLEAR IGG SER QL IA: NORMAL

## 2021-05-14 ENCOUNTER — HOSPITAL ENCOUNTER (OUTPATIENT)
Dept: LAB | Facility: MEDICAL CENTER | Age: 36
End: 2021-05-14
Attending: FAMILY MEDICINE
Payer: COMMERCIAL

## 2021-05-14 LAB
BASOPHILS # BLD AUTO: 0.8 % (ref 0–1.8)
BASOPHILS # BLD: 0.05 K/UL (ref 0–0.12)
EOSINOPHIL # BLD AUTO: 0.13 K/UL (ref 0–0.51)
EOSINOPHIL NFR BLD: 2 % (ref 0–6.9)
ERYTHROCYTE [DISTWIDTH] IN BLOOD BY AUTOMATED COUNT: 42.7 FL (ref 35.9–50)
HCT VFR BLD AUTO: 40.8 % (ref 37–47)
HGB BLD-MCNC: 13.9 G/DL (ref 12–16)
IMM GRANULOCYTES # BLD AUTO: 0.02 K/UL (ref 0–0.11)
IMM GRANULOCYTES NFR BLD AUTO: 0.3 % (ref 0–0.9)
LYMPHOCYTES # BLD AUTO: 1.89 K/UL (ref 1–4.8)
LYMPHOCYTES NFR BLD: 29 % (ref 22–41)
MCH RBC QN AUTO: 30.8 PG (ref 27–33)
MCHC RBC AUTO-ENTMCNC: 34.1 G/DL (ref 33.6–35)
MCV RBC AUTO: 90.3 FL (ref 81.4–97.8)
MONOCYTES # BLD AUTO: 0.46 K/UL (ref 0–0.85)
MONOCYTES NFR BLD AUTO: 7.1 % (ref 0–13.4)
NEUTROPHILS # BLD AUTO: 3.97 K/UL (ref 2–7.15)
NEUTROPHILS NFR BLD: 60.8 % (ref 44–72)
NRBC # BLD AUTO: 0 K/UL
NRBC BLD-RTO: 0 /100 WBC
PLATELET # BLD AUTO: 244 K/UL (ref 164–446)
PMV BLD AUTO: 10.8 FL (ref 9–12.9)
RBC # BLD AUTO: 4.52 M/UL (ref 4.2–5.4)
WBC # BLD AUTO: 6.5 K/UL (ref 4.8–10.8)

## 2021-05-14 PROCEDURE — 82306 VITAMIN D 25 HYDROXY: CPT

## 2021-05-14 PROCEDURE — 36415 COLL VENOUS BLD VENIPUNCTURE: CPT

## 2021-05-14 PROCEDURE — 80053 COMPREHEN METABOLIC PANEL: CPT

## 2021-05-14 PROCEDURE — 85025 COMPLETE CBC W/AUTO DIFF WBC: CPT

## 2021-05-14 PROCEDURE — 86376 MICROSOMAL ANTIBODY EACH: CPT

## 2021-05-14 PROCEDURE — 84443 ASSAY THYROID STIM HORMONE: CPT

## 2021-05-15 LAB
ALBUMIN SERPL BCP-MCNC: 4 G/DL (ref 3.2–4.9)
ALBUMIN/GLOB SERPL: 1.7 G/DL
ALP SERPL-CCNC: 57 U/L (ref 30–99)
ALT SERPL-CCNC: 18 U/L (ref 2–50)
ANION GAP SERPL CALC-SCNC: 9 MMOL/L (ref 7–16)
AST SERPL-CCNC: 16 U/L (ref 12–45)
BILIRUB SERPL-MCNC: 0.3 MG/DL (ref 0.1–1.5)
BUN SERPL-MCNC: 23 MG/DL (ref 8–22)
CALCIUM SERPL-MCNC: 8.7 MG/DL (ref 8.5–10.5)
CHLORIDE SERPL-SCNC: 108 MMOL/L (ref 96–112)
CO2 SERPL-SCNC: 23 MMOL/L (ref 20–33)
CREAT SERPL-MCNC: 0.76 MG/DL (ref 0.5–1.4)
GLOBULIN SER CALC-MCNC: 2.4 G/DL (ref 1.9–3.5)
GLUCOSE SERPL-MCNC: 105 MG/DL (ref 65–99)
POTASSIUM SERPL-SCNC: 4 MMOL/L (ref 3.6–5.5)
PROT SERPL-MCNC: 6.4 G/DL (ref 6–8.2)
SODIUM SERPL-SCNC: 140 MMOL/L (ref 135–145)
TSH SERPL DL<=0.005 MIU/L-ACNC: 2.19 UIU/ML (ref 0.38–5.33)

## 2021-05-17 LAB
25(OH)D3 SERPL-MCNC: 123 NG/ML (ref 30–80)
THYROPEROXIDASE AB SERPL-ACNC: <0.3 IU/ML (ref 0–9)

## 2021-08-22 NOTE — PROCEDURE: LIQUID NITROGEN
Add 52 Modifier (Optional): no
Detail Level: Detailed
Consent: The patient's consent was obtained including but not limited to risks of crusting, scabbing, blistering, scarring, darker or lighter pigmentary change, recurrence, incomplete removal and infection.
Post-Care Instructions: I reviewed with the patient in detail post-care instructions. Patient is to wear sunprotection, and avoid picking at any of the treated lesions. Pt may apply Vaseline to crusted or scabbing areas.
Medical Necessity Clause: This procedure was medically necessary because the lesions that were treated were:
other
Medical Necessity Information: It is in your best interest to select a reason for this procedure from the list below. All of these items fulfill various CMS LCD requirements except the new and changing color options.

## 2021-09-20 ENCOUNTER — HOSPITAL ENCOUNTER (OUTPATIENT)
Dept: LAB | Facility: MEDICAL CENTER | Age: 36
End: 2021-09-20
Attending: FAMILY MEDICINE
Payer: COMMERCIAL

## 2021-09-20 LAB
T4 FREE SERPL-MCNC: 1.52 NG/DL (ref 0.93–1.7)
TSH SERPL DL<=0.005 MIU/L-ACNC: 1.13 UIU/ML (ref 0.38–5.33)

## 2021-09-20 PROCEDURE — 84443 ASSAY THYROID STIM HORMONE: CPT

## 2021-09-20 PROCEDURE — 36415 COLL VENOUS BLD VENIPUNCTURE: CPT

## 2021-09-20 PROCEDURE — 84439 ASSAY OF FREE THYROXINE: CPT

## 2021-12-15 ENCOUNTER — APPOINTMENT (OUTPATIENT)
Dept: RADIOLOGY | Facility: MEDICAL CENTER | Age: 36
End: 2021-12-15
Payer: COMMERCIAL

## 2021-12-15 ENCOUNTER — HOSPITAL ENCOUNTER (EMERGENCY)
Facility: MEDICAL CENTER | Age: 36
End: 2021-12-15
Attending: EMERGENCY MEDICINE
Payer: COMMERCIAL

## 2021-12-15 ENCOUNTER — APPOINTMENT (OUTPATIENT)
Dept: RADIOLOGY | Facility: MEDICAL CENTER | Age: 36
End: 2021-12-15
Attending: EMERGENCY MEDICINE
Payer: COMMERCIAL

## 2021-12-15 VITALS
WEIGHT: 192.46 LBS | SYSTOLIC BLOOD PRESSURE: 125 MMHG | TEMPERATURE: 97.7 F | RESPIRATION RATE: 23 BRPM | BODY MASS INDEX: 26.07 KG/M2 | OXYGEN SATURATION: 99 % | DIASTOLIC BLOOD PRESSURE: 77 MMHG | HEART RATE: 84 BPM | HEIGHT: 72 IN

## 2021-12-15 DIAGNOSIS — R06.02 SHORTNESS OF BREATH: ICD-10-CM

## 2021-12-15 LAB
ALBUMIN SERPL BCP-MCNC: 4.3 G/DL (ref 3.2–4.9)
ALBUMIN/GLOB SERPL: 1.6 G/DL
ALP SERPL-CCNC: 61 U/L (ref 30–99)
ALT SERPL-CCNC: 26 U/L (ref 2–50)
ANION GAP SERPL CALC-SCNC: 12 MMOL/L (ref 7–16)
AST SERPL-CCNC: 18 U/L (ref 12–45)
BASOPHILS # BLD AUTO: 0.9 % (ref 0–1.8)
BASOPHILS # BLD: 0.06 K/UL (ref 0–0.12)
BILIRUB SERPL-MCNC: 0.4 MG/DL (ref 0.1–1.5)
BUN SERPL-MCNC: 22 MG/DL (ref 8–22)
CALCIUM SERPL-MCNC: 8.4 MG/DL (ref 8.4–10.2)
CHLORIDE SERPL-SCNC: 108 MMOL/L (ref 96–112)
CO2 SERPL-SCNC: 20 MMOL/L (ref 20–33)
CREAT SERPL-MCNC: 0.76 MG/DL (ref 0.5–1.4)
EKG IMPRESSION: NORMAL
EOSINOPHIL # BLD AUTO: 0.28 K/UL (ref 0–0.51)
EOSINOPHIL NFR BLD: 4.4 % (ref 0–6.9)
ERYTHROCYTE [DISTWIDTH] IN BLOOD BY AUTOMATED COUNT: 41.4 FL (ref 35.9–50)
GLOBULIN SER CALC-MCNC: 2.7 G/DL (ref 1.9–3.5)
GLUCOSE SERPL-MCNC: 88 MG/DL (ref 65–99)
HCT VFR BLD AUTO: 45.1 % (ref 37–47)
HGB BLD-MCNC: 15 G/DL (ref 12–16)
IMM GRANULOCYTES # BLD AUTO: 0.02 K/UL (ref 0–0.11)
IMM GRANULOCYTES NFR BLD AUTO: 0.3 % (ref 0–0.9)
LYMPHOCYTES # BLD AUTO: 2.09 K/UL (ref 1–4.8)
LYMPHOCYTES NFR BLD: 32.9 % (ref 22–41)
MCH RBC QN AUTO: 29.6 PG (ref 27–33)
MCHC RBC AUTO-ENTMCNC: 33.3 G/DL (ref 33.6–35)
MCV RBC AUTO: 89.1 FL (ref 81.4–97.8)
MONOCYTES # BLD AUTO: 0.54 K/UL (ref 0–0.85)
MONOCYTES NFR BLD AUTO: 8.5 % (ref 0–13.4)
NEUTROPHILS # BLD AUTO: 3.37 K/UL (ref 2–7.15)
NEUTROPHILS NFR BLD: 53 % (ref 44–72)
NRBC # BLD AUTO: 0 K/UL
NRBC BLD-RTO: 0 /100 WBC
PLATELET # BLD AUTO: 245 K/UL (ref 164–446)
PMV BLD AUTO: 9.4 FL (ref 9–12.9)
POTASSIUM SERPL-SCNC: 3.9 MMOL/L (ref 3.6–5.5)
PROT SERPL-MCNC: 7 G/DL (ref 6–8.2)
RBC # BLD AUTO: 5.06 M/UL (ref 4.2–5.4)
SODIUM SERPL-SCNC: 140 MMOL/L (ref 135–145)
TROPONIN T SERPL-MCNC: <6 NG/L (ref 6–19)
WBC # BLD AUTO: 6.4 K/UL (ref 4.8–10.8)

## 2021-12-15 PROCEDURE — 93005 ELECTROCARDIOGRAM TRACING: CPT

## 2021-12-15 PROCEDURE — 36415 COLL VENOUS BLD VENIPUNCTURE: CPT

## 2021-12-15 PROCEDURE — 80053 COMPREHEN METABOLIC PANEL: CPT

## 2021-12-15 PROCEDURE — 84484 ASSAY OF TROPONIN QUANT: CPT

## 2021-12-15 PROCEDURE — 85025 COMPLETE CBC W/AUTO DIFF WBC: CPT

## 2021-12-15 PROCEDURE — 71045 X-RAY EXAM CHEST 1 VIEW: CPT

## 2021-12-15 PROCEDURE — 99283 EMERGENCY DEPT VISIT LOW MDM: CPT

## 2021-12-15 PROCEDURE — 93005 ELECTROCARDIOGRAM TRACING: CPT | Performed by: EMERGENCY MEDICINE

## 2021-12-15 RX ORDER — OMEPRAZOLE 20 MG/1
20 CAPSULE, DELAYED RELEASE ORAL DAILY
COMMUNITY

## 2021-12-15 RX ORDER — ALBUTEROL SULFATE 90 UG/1
2 AEROSOL, METERED RESPIRATORY (INHALATION) EVERY 6 HOURS PRN
Qty: 8.5 G | Refills: 1 | Status: SHIPPED | OUTPATIENT
Start: 2021-12-15

## 2021-12-15 RX ORDER — CYCLOBENZAPRINE HCL 10 MG
10 TABLET ORAL
COMMUNITY

## 2021-12-15 ASSESSMENT — LIFESTYLE VARIABLES
DO YOU DRINK ALCOHOL: YES
HAVE YOU EVER FELT YOU SHOULD CUT DOWN ON YOUR DRINKING: NO

## 2021-12-15 ASSESSMENT — FIBROSIS 4 INDEX: FIB4 SCORE: 0.54

## 2021-12-15 NOTE — ED PROVIDER NOTES
"ED Provider Note    CHIEF COMPLAINT  Chief Complaint   Patient presents with   • Shortness of Breath     Dx w/ Covid 11/05/21, states SPo2 reading 85% in mornings after waking up   • Chest Pain     \"off and on\" s/p Covid, \"super sharp tightness\"       HPI  Benito Fish is a 36 y.o. female who presents stating that she was positive for Covid in early November and has never really recovered from the illness.  She has had chest discomfort on and off for many weeks and at times her chest feels tight.  She often wakes up in the morning with her pulse oximetry at home reading 85% and that it improves shortly thereafter but she always seems to feel short of breath.  Did not have any history of DVT or PE or hormone use.  She has not seen her primary care physician in person with the symptoms which have been plaguing her for almost 6 weeks.  She has been largely out of work as a result of her symptoms.  Denies any fever, chills, sweats, hemoptysis    REVIEW OF SYSTEMS  See HPI for further details. All other systems are negative.     PAST MEDICAL HISTORY  Past Medical History:   Diagnosis Date   • Anesthesia     took a really long time to wake up (3 days)   • Cold 8/2016    severe hayfever   • COVID     11/2021   • Depression    • Hemorrhagic disorder (HCC)     nose bleeds from allergies, flonase   • Thyroid disease        FAMILY HISTORY  Family History   Problem Relation Age of Onset   • Thyroid Mother    • Seizures Father    • Hyperlipidemia Father    • Hypertension Father    • Thyroid Sister    • Alcohol/Drug Brother    • Rheumatologic Disease Maternal Grandmother         RA   • Alcohol/Drug Sister        SOCIAL HISTORY   reports that she has never smoked. She has never used smokeless tobacco. She reports current alcohol use. She reports that she does not use drugs.    SURGICAL HISTORY  Past Surgical History:   Procedure Laterality Date   • THYROIDECTOMY TOTAL  9/9/2016    Procedure: RIGHT THYROIDECTOMY  UNILATERAL, " NIMS RECURRENT LARYNGEAL NERVE MONITORING;  Surgeon: Nita Sage M.D.;  Location: SURGERY [facility];  Service:    • OTHER ABDOMINAL SURGERY  4/2009    appy   • OTHER ORTHOPEDIC SURGERY  2005    ankle repair left, ligament repair   • APPENDECTOMY         CURRENT MEDICATIONS  Home Medications    **Home medications have not yet been reviewed for this encounter**         ALLERGIES  Allergies   Allergen Reactions   • Codeine Vomiting     [id number] 8 to 9 hours of vomiting   • Other Environmental      Severe hayfever       PHYSICAL EXAM  VITAL SIGNS: /88   Pulse 87   Temp 36.5 °C (97.7 °F) (Temporal)   Resp 18   Ht 1.829 m (6')   Wt 87.3 kg (192 lb 7.4 oz)   SpO2 100%   BMI 26.10 kg/m²    Constitutional: Well developed, Well nourished, No acute distress, Non-toxic appearance.   HENT: Normocephalic, Atraumatic, Bilateral external ears normal, Oropharynx is clear mucous membranes are moist. No oral exudates or nasal discharge.   Eyes: Pupils are equal round and reactive, EOMI, Conjunctiva normal, No discharge.   Neck: Normal range of motion, No tenderness, Supple, No stridor. No meningismus.  Lymphatic: No lymphadenopathy noted.   Cardiovascular: Regular rate and rhythm without murmur rub or gallop.  Thorax & Lungs: Clear breath sounds bilaterally without wheezes, rhonchi or rales. There is no chest wall tenderness.   Abdomen: Soft non-tender non-distended. There is no rebound or guarding. No organomegaly is appreciated. Bowel sounds are normal.  Skin: Normal without rash.   Back: No CVA or spinal tenderness.   Extremities: Intact distal pulses, No edema/leg swelling and negative Homans' sign bilaterally, No tenderness, No cyanosis, No clubbing. Capillary refill is less than 2 seconds.  Musculoskeletal: Good range of motion in all major joints. No tenderness to palpation or major deformities noted.   Neurologic: Alert & oriented x 3, Normal motor function, Normal sensory function, No focal deficits  noted. Reflexes are normal.  Psychiatric: Affect normal, Judgment normal, Mood normal. There is no suicidal ideation or patient reported hallucinations.     EKG  Results for orders placed or performed during the hospital encounter of 12/15/21   EKG   Result Value Ref Range    Report       [facility] Emergency Dept.    Test Date:  2021-12-15  Pt Name:    SEAMUS NAVARRO               Department: [facility]  MRN:        [id number]                      Room:  Gender:     Female                       Technician: EO  :        1985                   Requested By:ER TRIAGE PROTOCOL  Order #:    [id number]                    Reading MD: PUSHPA VILLASEÑOR MD    Measurements  Intervals                                Axis  Rate:       79                           P:          19  IL:         136                          QRS:        4  QRSD:       84                           T:          45  QT:         368  QTc:        422    Interpretive Statements  SINUS RHYTHM  RSR' IN V1 OR V2, PROBABLY NORMAL VARIANT  BASELINE WANDER IN LEAD(S) I,III,aVL,V4,V5,V6  No previous ECG available for comparison  Electronically Signed On 12- 12:17:43 PST by PUSHPA VILLASEÑOR MD           RADIOLOGY/PROCEDURES  DX-CHEST-PORTABLE (1 VIEW)   Final Result      No radiographic evidence of acute cardiopulmonary process.            COURSE & MEDICAL DECISION MAKING  Pertinent Labs & Imaging studies reviewed. (See chart for details)  The patient's symptoms are concerning for possible long-haul Covid syndrome and although she is not hypoxic here for rest in the emergency department I did check her device that she uses at home it seems to be measuring the same as our pulse oximetry here and that she is sometimes 85% is concerning    EKG shows no evidence of acute ischemic changes or dysrhythmia.  Chest x-ray shows no evidence of acute airspace disease, effusion, cardiomegaly    Laboratory evaluation reveals no leukocytosis, shift, anemia,  electrolyte derangements or acidosis.  Troponins unremarkable.    The patient symptoms were minor in the emergency department and on repeat evaluation and during explanation of her blood work she does not have any hypoxia.  She likely needs to be seen by her primary care provider Dr. West to determine need for referral potentially to pulmonology if her symptoms persist.    I prescribed albuterol to assist in her breathing and she is discharged in stable condition will return if any significant change in symptoms and she understands her outpatient plan of care    FINAL IMPRESSION  1. Shortness of breath             Electronically signed by: Austin Timmons M.D., 12/15/2021 12:17 PM

## 2021-12-15 NOTE — ED TRIAGE NOTES
"Chief Complaint   Patient presents with   • Shortness of Breath     Dx w/ Covid 11/05/21, states SPo2 reading 85% in mornings after waking up   • Chest Pain     \"off and on\" s/p Covid, \"super sharp tightness\"     /88   Pulse 87   Temp 36.5 °C (97.7 °F) (Temporal)   Resp 18   Ht 1.829 m (6')   Wt 87.3 kg (192 lb 7.4 oz)   SpO2 100%   BMI 26.10 kg/m²     Has this patient been vaccinated for COVID YES  If not, would they like to be vaccinated while in the ER if eligible?  NA  Would the patient like to speak with the ERP about the possibility of receiving the COVID vaccine today before making a decision? NA        "

## 2021-12-15 NOTE — DISCHARGE INSTRUCTIONS
"Follow-up with Dr. West as you may need referral to the Covid [facility] with pulmonary consultation and PFTs    We do not see any lab work abnormality in your chest x-ray is normal.  Please use albuterol as needed to improve your breathing  "

## 2021-12-15 NOTE — ED NOTES
Med rec updated and complete  Allergies reviewed  Interviewed pt with wife at bedside with permission from pt.  Pt reports no antibiotics in the last 30 days.      No current facility-administered medications on file prior to encounter.     Current Outpatient Medications on File Prior to Encounter   Medication Sig Dispense Refill   • cyclobenzaprine (FLEXERIL) 10 mg Tab Take 10 mg by mouth at bedtime.     • Coenzyme Q10 (CO Q 10 PO) Take 1 Capsule by mouth every evening.     • Non Formulary Request Take 1 Capsule by mouth every day. Thyroid support (OTC)     • aspirin EC (ECOTRIN) 81 MG Tablet Delayed Response Take 162 mg by mouth every day.     • omeprazole (PRILOSEC) 20 MG delayed-release capsule Take 20 mg by mouth every day.     • Multiple Vitamins-Minerals (OCUVITE-LUTEIN) Tab Take 1 Tablet by mouth every evening.     • Doxylamine Succinate, Sleep, (UNISOM PO) Take 1 Tablet by mouth at bedtime.     • levothyroxine (SYNTHROID) 75 MCG Tab Take 75 mcg by mouth every morning on an empty stomach.     • trazodone (DESYREL) 50 MG Tab Take 50 mg by mouth every bedtime.

## 2022-06-10 ASSESSMENT — ENCOUNTER SYMPTOMS
SHORTNESS OF BREATH: 1
HEMOPTYSIS: 0
CHEST TIGHTNESS: 1
WHEEZING: 0
DYSPNEA AT REST: 1

## 2022-06-13 ENCOUNTER — OFFICE VISIT (OUTPATIENT)
Dept: SLEEP MEDICINE | Facility: MEDICAL CENTER | Age: 37
End: 2022-06-13
Payer: COMMERCIAL

## 2022-06-13 VITALS
BODY MASS INDEX: 26.41 KG/M2 | HEIGHT: 72 IN | OXYGEN SATURATION: 99 % | SYSTOLIC BLOOD PRESSURE: 112 MMHG | HEART RATE: 88 BPM | WEIGHT: 195 LBS | DIASTOLIC BLOOD PRESSURE: 68 MMHG

## 2022-06-13 DIAGNOSIS — U07.1 COVID-19: ICD-10-CM

## 2022-06-13 PROCEDURE — 99204 OFFICE O/P NEW MOD 45 MIN: CPT | Mod: 25 | Performed by: STUDENT IN AN ORGANIZED HEALTH CARE EDUCATION/TRAINING PROGRAM

## 2022-06-13 ASSESSMENT — ENCOUNTER SYMPTOMS
HEMOPTYSIS: 0
SPUTUM PRODUCTION: 0
FEVER: 0
COUGH: 0
WEIGHT LOSS: 0
CHILLS: 0
WHEEZING: 0
SHORTNESS OF BREATH: 1

## 2022-06-13 ASSESSMENT — FIBROSIS 4 INDEX: FIB4 SCORE: 0.5

## 2022-06-13 ASSESSMENT — PATIENT HEALTH QUESTIONNAIRE - PHQ9: CLINICAL INTERPRETATION OF PHQ2 SCORE: 0

## 2022-06-13 NOTE — PROGRESS NOTES
Pulmonary Clinic Note    Chief Complaint:  Chief Complaint   Patient presents with   • Establish Care     Referred by [facility] for Covid 19     HPI:   Benito Fish is a very pleasant 36 y.o. female, non-smoker with history of hypothyroidism who presents to pulmonary clinic for dyspnea an tachycardia post COVID.     She got COVID in 11/2021 and end of 1/2022 and did not need supplemental O2. She has persistent fatigue, insomnia, anxiety and tachycardia. She was also having dyspnea which is now improved. She has been slowly increasing her activity levels but feels like the biggest barrier has been her HR. Sometimes her HR jumps to 160s even at rest and improves on it's own. Becomes tachy quickly upon exerting herself. Has not seen cardiology or been evaluated for this.  Also reports 15+ year history of intermittent joint aches/fevers - states about twice a year, she has 4-5 week episode where she gets low grade fevers, joint pains in hands/shoulders and swelling in her hands and feet that resolves spontaneously. Had elevated KEVIN several years ago but all repeat rheum w/u in the past few years has been negative - she was seen by rheum before and was told it was unclear what the etiology was since she was never in these acute episodes when she was f/u with rheum - last episode was about 5 months ago.      Past Medical History:   Diagnosis Date   • Anesthesia     took a really long time to wake up (3 days)   • Bruxism    • Chickenpox    • Cold 08/2016    severe hayfever   • COVID     11/2021   • Depression    • Fatigue    • Hemorrhagic disorder (HCC)     nose bleeds from allergies, flonase   • Hypothyroidism    • Influenza    • Insomnia    • Palpitations    • Rhinitis    • Shortness of breath    • Thyroid disease    • Tonsillitis    • Wears glasses        Past Surgical History:   Procedure Laterality Date   • THYROIDECTOMY TOTAL  9/9/2016    Procedure: RIGHT THYROIDECTOMY  UNILATERAL,  NIMS RECURRENT LARYNGEAL NERVE MONITORING;  Surgeon: Nita Sage M.D.;  Location: SURGERY [facility];  Service:    • OTHER ABDOMINAL SURGERY  4/2009    appy   • OTHER ORTHOPEDIC SURGERY  2005    ankle repair left, ligament repair   • APPENDECTOMY         Social History     Socioeconomic History   • Marital status:      Spouse name: Not on file   • Number of children: Not on file   • Years of education: Not on file   • Highest education level: Not on file   Occupational History   • Not on file   Tobacco Use   • Smoking status: Never Smoker   • Smokeless tobacco: Never Used   Vaping Use   • Vaping Use: Never used   Substance and Sexual Activity   • Alcohol use: Yes     Alcohol/week: 1.2 - 1.8 oz     Types: 2 - 3 Cans of beer per week   • Drug use: No   • Sexual activity: Yes     Partners: Female   Other Topics Concern   • Not on file   Social History Narrative   • Not on file     Social Determinants of Health     Financial Resource Strain: Not on file   Food Insecurity: Not on file   Transportation Needs: Not on file   Physical Activity: Not on file   Stress: Not on file   Social Connections: Not on file   Intimate Partner Violence: Not on file   Housing Stability: Not on file          Family History   Problem Relation Age of Onset   • Thyroid Mother    • Seizures Father    • Hyperlipidemia Father    • Hypertension Father    • Thyroid Sister    • Alcohol/Drug Brother    • Rheumatologic Disease Maternal Grandmother         RA   • Alcohol/Drug Sister    • Colon Cancer Paternal Grandmother        Current Outpatient Medications on File Prior to Visit   Medication Sig Dispense Refill   • CALCIUM PO Take  by mouth every day.     • cyclobenzaprine (FLEXERIL) 10 mg Tab Take 10 mg by mouth at bedtime.     • Coenzyme Q10 (CO Q 10 PO) Take 1 Capsule by mouth every evening.     • Non Formulary Request Take 1 Capsule by mouth every day. Thyroid support (OTC)     • omeprazole (PRILOSEC) 20 MG delayed-release  capsule Take 20 mg by mouth every day.     • albuterol 108 (90 Base) MCG/ACT Aero Soln inhalation aerosol Inhale 2 Puffs every 6 hours as needed for Shortness of Breath. 8.5 g 1   • Multiple Vitamins-Minerals (OCUVITE-LUTEIN) Tab Take 1 Tablet by mouth every evening.     • Doxylamine Succinate, Sleep, (UNISOM PO) Take 1 Tablet by mouth at bedtime.     • levothyroxine (SYNTHROID) 75 MCG Tab Take 75 mcg by mouth every morning on an empty stomach.     • trazodone (DESYREL) 50 MG Tab Take 50 mg by mouth every bedtime.       No current facility-administered medications on file prior to visit.       Allergies: Codeine and Other environmental      ROS:   Review of Systems   Constitutional: Positive for malaise/fatigue. Negative for chills, fever and weight loss.   HENT: Negative for congestion.    Respiratory: Positive for shortness of breath. Negative for cough, hemoptysis, sputum production and wheezing.    Cardiovascular: Negative for chest pain.   All other systems reviewed and are negative.      Vitals:  /68 (BP Location: Left arm, Patient Position: Sitting, BP Cuff Size: Adult)   Pulse 88   Ht 1.829 m (6')   Wt 88.5 kg (195 lb)   SpO2 99%     Physical Exam:  Physical Exam  Vitals and nursing note reviewed.   Constitutional:       General: She is not in acute distress.     Appearance: Normal appearance. She is not ill-appearing or toxic-appearing.   HENT:      Head: Normocephalic and atraumatic.      Nose: Nose normal.      Mouth/Throat:      Mouth: Mucous membranes are moist.   Eyes:      General: No scleral icterus.     Conjunctiva/sclera: Conjunctivae normal.   Cardiovascular:      Rate and Rhythm: Normal rate and regular rhythm.   Pulmonary:      Effort: Pulmonary effort is normal. No respiratory distress.      Breath sounds: No wheezing, rhonchi or rales.   Abdominal:      Palpations: Abdomen is soft.   Musculoskeletal:         General: No deformity or signs of injury. Normal range of motion.       Cervical back: Normal range of motion.   Skin:     General: Skin is warm and dry.   Neurological:      General: No focal deficit present.      Mental Status: She is alert. Mental status is at baseline.   Psychiatric:         Mood and Affect: Mood normal.         Behavior: Behavior normal.         Data:  CXR 12/15/21  No radiographic evidence of acute cardiopulmonary process.    Assessment/Plan:    Problem List Items Addressed This Visit     COVID-19     Patient w/multiple symptoms post COVID that potentially these long COVID symptoms. SpO2 99% on RA and exam unremarkable. CXR in 12/2021 unremarkable so don't suspect any acute pulmonary pathology.    - cardiology referral for tachycardia - might need a Holter monitor to evaluate for arrhythmias  - recommend f/u w/PMD - will order some basic labs to evaluate for any abnormalities  - recommend f/u with rheumatology during the time of these fever/joint aches episodes (has not had one in 5 months so will monitor for recurrence) - she will f/u with her PMD for this           Relevant Orders    TSH WITH REFLEX TO FT4    proBrain Natriuretic Peptide, NT    CBC WITH DIFFERENTIAL    Comp Metabolic Panel    REFERRAL TO CARDIOLOGY          Return if symptoms worsen or fail to improve.       Time spent in record review prior to patient arrival, reviewing results, and in face-to-face encounter totaled 45 min, excluding any procedures if performed.    Shalonda Bravo MD  Pulmonary and Critical Care Medicine  [facility]

## 2022-06-13 NOTE — ASSESSMENT & PLAN NOTE
Patient w/multiple symptoms post COVID that potentially these long COVID symptoms. SpO2 99% on RA and exam unremarkable. CXR in 12/2021 unremarkable so don't suspect any acute pulmonary pathology.    - cardiology referral for tachycardia - might need a Holter monitor to evaluate for arrhythmias  - recommend f/u w/PMD - will order some basic labs to evaluate for any abnormalities  - recommend f/u with rheumatology during the time of these fever/joint aches episodes (has not had one in 5 months so will monitor for recurrence) - she will f/u with her PMD for this

## 2022-06-14 NOTE — PROCEDURES
Multi-Ox Readings  Multi Ox #1 Room air   O2 sat % at rest 97   O2 sat % on exertion 95   O2 sat average on exertion     Multi Ox #2     O2 sat % at rest     O2 sat % on exertion     O2 sat average on exertion       Oxygen Use     Oxygen Frequency     Duration of need     Is the patient mobile within the home?     CPAP Use?     BIPAP Use?     Servo Titration

## 2022-06-15 ENCOUNTER — HOSPITAL ENCOUNTER (OUTPATIENT)
Dept: LAB | Facility: MEDICAL CENTER | Age: 37
End: 2022-06-15
Attending: STUDENT IN AN ORGANIZED HEALTH CARE EDUCATION/TRAINING PROGRAM
Payer: COMMERCIAL

## 2022-06-15 DIAGNOSIS — U07.1 COVID-19: ICD-10-CM

## 2022-06-15 LAB
ALBUMIN SERPL BCP-MCNC: 4.1 G/DL (ref 3.2–4.9)
ALBUMIN/GLOB SERPL: 1.6 G/DL
ALP SERPL-CCNC: 54 U/L (ref 30–99)
ALT SERPL-CCNC: 29 U/L (ref 2–50)
ANION GAP SERPL CALC-SCNC: 11 MMOL/L (ref 7–16)
AST SERPL-CCNC: 20 U/L (ref 12–45)
BASOPHILS # BLD AUTO: 1.4 % (ref 0–1.8)
BASOPHILS # BLD: 0.07 K/UL (ref 0–0.12)
BILIRUB SERPL-MCNC: 0.5 MG/DL (ref 0.1–1.5)
BUN SERPL-MCNC: 23 MG/DL (ref 8–22)
CALCIUM SERPL-MCNC: 8.8 MG/DL (ref 8.5–10.5)
CHLORIDE SERPL-SCNC: 111 MMOL/L (ref 96–112)
CO2 SERPL-SCNC: 20 MMOL/L (ref 20–33)
CREAT SERPL-MCNC: 0.76 MG/DL (ref 0.5–1.4)
EOSINOPHIL # BLD AUTO: 0.54 K/UL (ref 0–0.51)
EOSINOPHIL NFR BLD: 10.8 % (ref 0–6.9)
ERYTHROCYTE [DISTWIDTH] IN BLOOD BY AUTOMATED COUNT: 38.2 FL (ref 35.9–50)
FASTING STATUS PATIENT QL REPORTED: NORMAL
GFR SERPLBLD CREATININE-BSD FMLA CKD-EPI: 104 ML/MIN/1.73 M 2
GLOBULIN SER CALC-MCNC: 2.5 G/DL (ref 1.9–3.5)
GLUCOSE SERPL-MCNC: 87 MG/DL (ref 65–99)
HCT VFR BLD AUTO: 40.6 % (ref 37–47)
HGB BLD-MCNC: 13.4 G/DL (ref 12–16)
IMM GRANULOCYTES # BLD AUTO: 0 K/UL (ref 0–0.11)
IMM GRANULOCYTES NFR BLD AUTO: 0 % (ref 0–0.9)
LYMPHOCYTES # BLD AUTO: 2.12 K/UL (ref 1–4.8)
LYMPHOCYTES NFR BLD: 42.2 % (ref 22–41)
MCH RBC QN AUTO: 28.4 PG (ref 27–33)
MCHC RBC AUTO-ENTMCNC: 33 G/DL (ref 33.6–35)
MCV RBC AUTO: 86 FL (ref 81.4–97.8)
MONOCYTES # BLD AUTO: 0.46 K/UL (ref 0–0.85)
MONOCYTES NFR BLD AUTO: 9.2 % (ref 0–13.4)
NEUTROPHILS # BLD AUTO: 1.83 K/UL (ref 2–7.15)
NEUTROPHILS NFR BLD: 36.4 % (ref 44–72)
NRBC # BLD AUTO: 0 K/UL
NRBC BLD-RTO: 0 /100 WBC
NT-PROBNP SERPL IA-MCNC: 64 PG/ML (ref 0–125)
PLATELET # BLD AUTO: 241 K/UL (ref 164–446)
PMV BLD AUTO: 11 FL (ref 9–12.9)
POTASSIUM SERPL-SCNC: 4.2 MMOL/L (ref 3.6–5.5)
PROT SERPL-MCNC: 6.6 G/DL (ref 6–8.2)
RBC # BLD AUTO: 4.72 M/UL (ref 4.2–5.4)
SODIUM SERPL-SCNC: 142 MMOL/L (ref 135–145)
TSH SERPL DL<=0.005 MIU/L-ACNC: 0.82 UIU/ML (ref 0.38–5.33)
WBC # BLD AUTO: 5 K/UL (ref 4.8–10.8)

## 2022-06-15 PROCEDURE — 83880 ASSAY OF NATRIURETIC PEPTIDE: CPT

## 2022-06-15 PROCEDURE — 84443 ASSAY THYROID STIM HORMONE: CPT

## 2022-06-15 PROCEDURE — 85025 COMPLETE CBC W/AUTO DIFF WBC: CPT

## 2022-06-15 PROCEDURE — 36415 COLL VENOUS BLD VENIPUNCTURE: CPT

## 2022-06-15 PROCEDURE — 80053 COMPREHEN METABOLIC PANEL: CPT

## 2022-09-17 ENCOUNTER — TELEPHONE (OUTPATIENT)
Dept: CARDIOLOGY | Facility: MEDICAL CENTER | Age: 37
End: 2022-09-17
Payer: COMMERCIAL

## 2022-09-17 NOTE — TELEPHONE ENCOUNTER
LVM for pt to confirm if this will be the first time they are meeting with cardio. Per chart review pt was treated in ED on 12/15/01 this is most recent testing.    Pending call back.

## 2022-09-22 ENCOUNTER — OFFICE VISIT (OUTPATIENT)
Dept: CARDIOLOGY | Facility: MEDICAL CENTER | Age: 37
End: 2022-09-22
Attending: STUDENT IN AN ORGANIZED HEALTH CARE EDUCATION/TRAINING PROGRAM
Payer: COMMERCIAL

## 2022-09-22 VITALS
WEIGHT: 194.6 LBS | HEIGHT: 72 IN | SYSTOLIC BLOOD PRESSURE: 128 MMHG | OXYGEN SATURATION: 98 % | DIASTOLIC BLOOD PRESSURE: 76 MMHG | HEART RATE: 79 BPM | BODY MASS INDEX: 26.36 KG/M2 | RESPIRATION RATE: 16 BRPM

## 2022-09-22 DIAGNOSIS — U09.9 POST-COVID SYNDROME: ICD-10-CM

## 2022-09-22 DIAGNOSIS — I47.10 PAROXYSMAL SUPRAVENTRICULAR TACHYCARDIA (HCC): ICD-10-CM

## 2022-09-22 DIAGNOSIS — R00.2 PALPITATIONS: ICD-10-CM

## 2022-09-22 LAB — EKG IMPRESSION: NORMAL

## 2022-09-22 PROCEDURE — 93000 ELECTROCARDIOGRAM COMPLETE: CPT | Performed by: INTERNAL MEDICINE

## 2022-09-22 PROCEDURE — 99204 OFFICE O/P NEW MOD 45 MIN: CPT | Mod: 25 | Performed by: INTERNAL MEDICINE

## 2022-09-22 RX ORDER — MELOXICAM 15 MG/1
15 TABLET ORAL DAILY
COMMUNITY
Start: 2022-07-14

## 2022-09-22 RX ORDER — LIOTHYRONINE SODIUM 5 UG/1
TABLET ORAL
COMMUNITY
Start: 2022-09-12

## 2022-09-22 ASSESSMENT — ENCOUNTER SYMPTOMS
NECK PAIN: 1
FALLS: 0
ORTHOPNEA: 0
VOMITING: 0
NERVOUS/ANXIOUS: 1
BLOATING: 0
FEVER: 1
DIARRHEA: 0
HEADACHES: 1
SLEEP DISTURBANCES DUE TO BREATHING: 0
DOUBLE VISION: 0
DIZZINESS: 1
IRREGULAR HEARTBEAT: 0
BACK PAIN: 0
DYSPNEA ON EXERTION: 0
SYNCOPE: 0
NEAR-SYNCOPE: 0
CONSTIPATION: 0
NIGHT SWEATS: 0
LOSS OF BALANCE: 0
SHORTNESS OF BREATH: 1
WEAKNESS: 0
DIAPHORESIS: 0
BLURRED VISION: 0
WHEEZING: 0
DECREASED APPETITE: 0
MYALGIAS: 0
FLANK PAIN: 0
INSOMNIA: 1
PALPITATIONS: 1
NAUSEA: 0
SORE THROAT: 0
PARESTHESIAS: 1
COUGH: 0
NUMBNESS: 0
LIGHT-HEADEDNESS: 0
EXCESSIVE DAYTIME SLEEPINESS: 0
PND: 0

## 2022-09-22 ASSESSMENT — FIBROSIS 4 INDEX: FIB4 SCORE: 0.54

## 2022-09-22 NOTE — PROGRESS NOTES
Cardiology Initial Consultation Note    Date of note:    9/22/2022    Primary Care Provider: Terrell BROCK M.D.  Referring Provider: Shalonda Bravo M.*     Patient Name: Benito Fish   YOB: 1985  MRN:              [id number]    Chief Complaint   Patient presents with    Chest Pain     Rapid heartbeat, hands and feet will swell and go numb, SOB       History of Present Illness: Ms. Benito Fish is a 36 y.o. female whose current medical problems include positive KEVIN, thyroid tumor s/p removal with resultant hypothyroidism who is here for cardiac consultation for palpitations and intermittent tachycardia.  Is accompanied by her wife.    Patient states that she was in her usual state of health until she had COVID infection twice last year, in November and end of January 2022.  After that, she was noted to have nocturnal hypoxia needing supplemental oxygen.  Has also been noticing episodes of tachycardia with heart rate around 160s at rest.  Symptoms usually last for few minutes with self resolution.  But does feel associated lightheadedness and fatigue.  Denies chest pain, pressure, dyspnea on exertion or orthopnea.  Palpitations are more pronounced at night when she lays in her bed.  Has also been noticing bilateral hand and feet swelling.    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    Cardiovascular Risk Factors:  1. Smoking status: Never smoker  2. Type II Diabetes Mellitus: No  Lab Results   Component Value Date/Time    HBA1C 5.4 09/12/2018 10:16 AM     3. Hypertension: No  4. Dyslipidemia:    Cholesterol,Tot   Date Value Ref Range Status   09/08/2018 202 (H) 100 - 199 mg/dL Final     LDL   Date Value Ref Range Status   09/08/2018 134 (H) <100 mg/dL Final     HDL   Date Value Ref Range Status   09/08/2018 52 >=40 mg/dL Final     Triglycerides   Date Value Ref Range Status   09/08/2018 79 0 - 149 mg/dL Final     5. Family history of early Coronary  Artery Disease in a first degree relative (Male less than 55 years of age; Female less than 65 years of age): Paternal grandfather had heart attack in his 60s  6.  Obesity and/or Metabolic Syndrome: No  7. Sedentary lifestyle: No    Review of Systems   Constitutional: Positive for fever. Negative for decreased appetite, diaphoresis, malaise/fatigue and night sweats.   HENT:  Negative for congestion and sore throat.    Eyes:  Negative for blurred vision and double vision.   Cardiovascular:  Positive for chest pain, leg swelling and palpitations. Negative for cyanosis, dyspnea on exertion, irregular heartbeat, near-syncope, orthopnea, paroxysmal nocturnal dyspnea and syncope.   Respiratory:  Positive for shortness of breath. Negative for cough, sleep disturbances due to breathing and wheezing.    Endocrine: Negative for cold intolerance and heat intolerance.   Musculoskeletal:  Positive for joint pain and neck pain. Negative for back pain, falls and myalgias.   Gastrointestinal:  Negative for bloating, constipation, diarrhea, nausea and vomiting.   Genitourinary:  Negative for dysuria and flank pain.   Neurological:  Positive for dizziness, headaches and paresthesias. Negative for excessive daytime sleepiness, light-headedness, loss of balance, numbness and weakness.   Psychiatric/Behavioral:  The patient has insomnia and is nervous/anxious.        Past Medical History:   Diagnosis Date    Anesthesia     took a really long time to wake up (3 days)    Bruxism     Chickenpox     Cold 08/2016    severe hayfever    COVID     11/2021    Depression     Fatigue     Hemorrhagic disorder (HCC)     nose bleeds from allergies, flonase    Hypothyroidism     Influenza     Insomnia     Palpitations     Rhinitis     Shortness of breath     Thyroid disease     Tonsillitis     Wears glasses          Past Surgical History:   Procedure Laterality Date    THYROIDECTOMY TOTAL  9/9/2016    Procedure: RIGHT THYROIDECTOMY  UNILATERAL, NIMS  RECURRENT LARYNGEAL NERVE MONITORING;  Surgeon: Nita Sage M.D.;  Location: SURGERY [facility];  Service:     OTHER ABDOMINAL SURGERY  4/2009    appy    OTHER ORTHOPEDIC SURGERY  2005    ankle repair left, ligament repair    APPENDECTOMY           Current Outpatient Medications   Medication Sig Dispense Refill    liothyronine (CYTOMEL) 5 MCG Tab       meloxicam (MOBIC) 15 MG tablet Take 15 mg by mouth every day.      CALCIUM PO Take  by mouth every day.      cyclobenzaprine (FLEXERIL) 10 mg Tab Take 10 mg by mouth at bedtime.      Coenzyme Q10 (CO Q 10 PO) Take 1 Capsule by mouth every evening.      Non Formulary Request Take 1 Capsule by mouth every day. Thyroid support (OTC)      omeprazole (PRILOSEC) 20 MG delayed-release capsule Take 20 mg by mouth every day.      albuterol 108 (90 Base) MCG/ACT Aero Soln inhalation aerosol Inhale 2 Puffs every 6 hours as needed for Shortness of Breath. 8.5 g 1    Multiple Vitamins-Minerals (OCUVITE-LUTEIN) Tab Take 1 Tablet by mouth every evening.      Doxylamine Succinate, Sleep, (UNISOM PO) Take 1 Tablet by mouth at bedtime.      levothyroxine (SYNTHROID) 75 MCG Tab Take 75 mcg by mouth every morning on an empty stomach.      trazodone (DESYREL) 50 MG Tab Take 50 mg by mouth every bedtime.       No current facility-administered medications for this visit.         Allergies   Allergen Reactions    Codeine Vomiting     [id number] 8 to 9 hours of vomiting    Other Environmental      Severe hayfever         Family History   Problem Relation Age of Onset    Thyroid Mother     Seizures Father     Hyperlipidemia Father     Hypertension Father     Thyroid Sister     Alcohol/Drug Brother     Rheumatologic Disease Maternal Grandmother         RA    Alcohol/Drug Sister     Colon Cancer Paternal Grandmother          Social History     Socioeconomic History    Marital status:      Spouse name: Not on file    Number of children: Not on file    Years of education: Not on  file    Highest education level: Not on file   Occupational History    Not on file   Tobacco Use    Smoking status: Never    Smokeless tobacco: Never   Vaping Use    Vaping Use: Never used   Substance and Sexual Activity    Alcohol use: Yes     Alcohol/week: 1.2 - 1.8 oz     Types: 2 - 3 Cans of beer per week    Drug use: Yes     Frequency: 2.0 times per week     Comment: gummy for sleep    Sexual activity: Yes     Partners: Female   Other Topics Concern    Not on file   Social History Narrative    Not on file     Social Determinants of Health     Financial Resource Strain: Not on file   Food Insecurity: Not on file   Transportation Needs: Not on file   Physical Activity: Not on file   Stress: Not on file   Social Connections: Not on file   Intimate Partner Violence: Not on file   Housing Stability: Not on file         Physical Exam:  Ambulatory Vitals  /76 (BP Location: Left arm, Patient Position: Sitting, BP Cuff Size: Adult)   Pulse 79   Resp 16   Ht 1.829 m (6')   Wt 88.3 kg (194 lb 9.6 oz)   SpO2 98%    Oxygen Therapy:  Pulse Oximetry: 98 %  BP Readings from Last 4 Encounters:   09/22/22 128/76   06/13/22 112/68   12/15/21 125/77   02/27/19 124/82       Weight/BMI: Body mass index is 26.39 kg/m².  Wt Readings from Last 4 Encounters:   09/22/22 88.3 kg (194 lb 9.6 oz)   06/13/22 88.5 kg (195 lb)   12/15/21 87.3 kg (192 lb 7.4 oz)   02/27/19 83.9 kg (185 lb)         General: Well appearing and in no apparent distress  Eyes: nl conjunctiva, no icteric sclera  ENT: wearing a mask, normal external appearance of ears  Neck: no visible JVP,  no carotid bruits  Lungs: normal respiratory effort, CTAB  Heart: Tachycardic, regular rhythm , no murmurs, no rubs or gallops,  no edema bilateral lower extremities. No LV/RV heave on cardiac palpatation. 2+ bilateral radial pulses.  2+ bilateral dp pulses.   Abdomen: soft, non tender, non distended, no masses, normal bowel sounds.  No HSM.  Extremities/MSK: no  clubbing, no cyanosis  Neurological: No focal sensory deficits  Psychiatric: Appropriate affect, A/O x 3, intact judgement and insight  Skin: Warm extremities      Lab Data Review:  Lab Results   Component Value Date/Time    CHOLSTRLTOT 202 (H) 09/08/2018 12:06 PM     (H) 09/08/2018 12:06 PM    HDL 52 09/08/2018 12:06 PM    TRIGLYCERIDE 79 09/08/2018 12:06 PM       Lab Results   Component Value Date/Time    SODIUM 142 06/15/2022 08:57 AM    POTASSIUM 4.2 06/15/2022 08:57 AM    CHLORIDE 111 06/15/2022 08:57 AM    CO2 20 06/15/2022 08:57 AM    GLUCOSE 87 06/15/2022 08:57 AM    BUN 23 (H) 06/15/2022 08:57 AM    CREATININE 0.76 06/15/2022 08:57 AM     Lab Results   Component Value Date/Time    ALKPHOSPHAT 54 06/15/2022 08:57 AM    ASTSGOT 20 06/15/2022 08:57 AM    ALTSGPT 29 06/15/2022 08:57 AM    TBILIRUBIN 0.5 06/15/2022 08:57 AM      Lab Results   Component Value Date/Time    WBC 5.0 06/15/2022 08:57 AM     Lab Results   Component Value Date/Time    HBA1C 5.4 09/12/2018 10:16 AM         Cardiac Imaging and Procedures Review:    EKG dated 9/22/2022: My personal interpretation is sinus rhythm, HR 85 bpm    EKG dated 12/15/2021: My first interpretation is sinus rhythm      Radiology test Review:  CXR 12/15/2021: No cardiomegaly        Assessment & Plan     1. Palpitations  EKG    Cardiac Event Monitor    EC-ECHOCARDIOGRAM COMPLETE W/O CONT      2. Paroxysmal supraventricular tachycardia (HCC)  Cardiac Event Monitor    EC-ECHOCARDIOGRAM COMPLETE W/O CONT      3. Post-COVID syndrome  Cardiac Event Monitor    EC-ECHOCARDIOGRAM COMPLETE W/O CONT            Shared Medical Decision Making:  Obtain transthoracic echocardiogram to evaluate underlying cardiac structure and function.  Rule out structural or valvular heart abnormality or pericardial effusion given ongoing symptoms and post-COVID syndrome.    Obtain 30 days cardiac event monitor to rule out any underlying arrhythmia associated with symptoms.  Encouraged  patient to trigger the device and write down her symptoms to best correlate them with underlying rhythm to which she voices understanding.    We also discussed vagal maneuvers during episodes of SVT/tachycardia.      All of patient's excellent questions were answered to the best of my knowledge and to her satisfaction.  It was a pleasure seeing Ms. Benito Fish in my clinic today. RTC if abnormal test results otherwise as needed. Patient is aware to call the cardiology clinic with any questions or concerns.      Tai Shaffer MD  [facility], [facility].  [address] [address]  Phone: [phone]  Fax: [phone]    Please note that this dictation was created using voice recognition software. I have made every reasonable attempt to correct obvious errors, but it is possible there are errors of grammar and possibly content that I did not discover before finalizing the note.

## 2022-09-28 ENCOUNTER — HOSPITAL ENCOUNTER (OUTPATIENT)
Dept: LAB | Facility: MEDICAL CENTER | Age: 37
End: 2022-09-28
Attending: OBSTETRICS & GYNECOLOGY
Payer: COMMERCIAL

## 2022-09-28 ENCOUNTER — NON-PROVIDER VISIT (OUTPATIENT)
Dept: CARDIOLOGY | Facility: MEDICAL CENTER | Age: 37
End: 2022-09-28
Attending: INTERNAL MEDICINE
Payer: COMMERCIAL

## 2022-09-28 DIAGNOSIS — I49.3 PVC'S (PREMATURE VENTRICULAR CONTRACTIONS): ICD-10-CM

## 2022-09-28 DIAGNOSIS — R00.2 PALPITATIONS: ICD-10-CM

## 2022-09-28 DIAGNOSIS — I47.10 PAROXYSMAL SUPRAVENTRICULAR TACHYCARDIA (HCC): ICD-10-CM

## 2022-09-28 DIAGNOSIS — I49.1 APC (ATRIAL PREMATURE CONTRACTIONS): ICD-10-CM

## 2022-09-28 DIAGNOSIS — R00.0 SINUS TACHYCARDIA: ICD-10-CM

## 2022-09-28 DIAGNOSIS — U09.9 POST-COVID SYNDROME: ICD-10-CM

## 2022-09-28 LAB
ALBUMIN SERPL BCP-MCNC: 4.2 G/DL (ref 3.2–4.9)
ALBUMIN/GLOB SERPL: 1.6 G/DL
ALP SERPL-CCNC: 66 U/L (ref 30–99)
ALT SERPL-CCNC: 17 U/L (ref 2–50)
ANION GAP SERPL CALC-SCNC: 13 MMOL/L (ref 7–16)
AST SERPL-CCNC: 13 U/L (ref 12–45)
BASOPHILS # BLD AUTO: 0.6 % (ref 0–1.8)
BASOPHILS # BLD: 0.03 K/UL (ref 0–0.12)
BILIRUB SERPL-MCNC: 0.3 MG/DL (ref 0.1–1.5)
BUN SERPL-MCNC: 22 MG/DL (ref 8–22)
CALCIUM SERPL-MCNC: 9 MG/DL (ref 8.5–10.5)
CHLORIDE SERPL-SCNC: 107 MMOL/L (ref 96–112)
CO2 SERPL-SCNC: 21 MMOL/L (ref 20–33)
CREAT SERPL-MCNC: 0.73 MG/DL (ref 0.5–1.4)
EOSINOPHIL # BLD AUTO: 0.11 K/UL (ref 0–0.51)
EOSINOPHIL NFR BLD: 2.3 % (ref 0–6.9)
ERYTHROCYTE [DISTWIDTH] IN BLOOD BY AUTOMATED COUNT: 41.2 FL (ref 35.9–50)
FSH SERPL-ACNC: 6.1 MIU/ML
GFR SERPLBLD CREATININE-BSD FMLA CKD-EPI: 109 ML/MIN/1.73 M 2
GLOBULIN SER CALC-MCNC: 2.6 G/DL (ref 1.9–3.5)
GLUCOSE SERPL-MCNC: 103 MG/DL (ref 65–99)
HCT VFR BLD AUTO: 44 % (ref 37–47)
HGB BLD-MCNC: 14.6 G/DL (ref 12–16)
IMM GRANULOCYTES # BLD AUTO: 0.01 K/UL (ref 0–0.11)
IMM GRANULOCYTES NFR BLD AUTO: 0.2 % (ref 0–0.9)
LYMPHOCYTES # BLD AUTO: 1.72 K/UL (ref 1–4.8)
LYMPHOCYTES NFR BLD: 35.9 % (ref 22–41)
MCH RBC QN AUTO: 27 PG (ref 27–33)
MCHC RBC AUTO-ENTMCNC: 33.2 G/DL (ref 33.6–35)
MCV RBC AUTO: 81.3 FL (ref 81.4–97.8)
MONOCYTES # BLD AUTO: 0.42 K/UL (ref 0–0.85)
MONOCYTES NFR BLD AUTO: 8.8 % (ref 0–13.4)
NEUTROPHILS # BLD AUTO: 2.5 K/UL (ref 2–7.15)
NEUTROPHILS NFR BLD: 52.2 % (ref 44–72)
NRBC # BLD AUTO: 0 K/UL
NRBC BLD-RTO: 0 /100 WBC
PLATELET # BLD AUTO: 238 K/UL (ref 164–446)
PMV BLD AUTO: 10.6 FL (ref 9–12.9)
POTASSIUM SERPL-SCNC: 4.2 MMOL/L (ref 3.6–5.5)
PROLACTIN SERPL-MCNC: 16.7 NG/ML (ref 2.8–26)
PROT SERPL-MCNC: 6.8 G/DL (ref 6–8.2)
RBC # BLD AUTO: 5.41 M/UL (ref 4.2–5.4)
SODIUM SERPL-SCNC: 141 MMOL/L (ref 135–145)
T4 FREE SERPL-MCNC: 1.18 NG/DL (ref 0.93–1.7)
TSH SERPL DL<=0.005 MIU/L-ACNC: 1.16 UIU/ML (ref 0.38–5.33)
WBC # BLD AUTO: 4.8 K/UL (ref 4.8–10.8)

## 2022-09-28 PROCEDURE — 84439 ASSAY OF FREE THYROXINE: CPT

## 2022-09-28 PROCEDURE — 36415 COLL VENOUS BLD VENIPUNCTURE: CPT

## 2022-09-28 PROCEDURE — 85025 COMPLETE CBC W/AUTO DIFF WBC: CPT

## 2022-09-28 PROCEDURE — 84443 ASSAY THYROID STIM HORMONE: CPT

## 2022-09-28 PROCEDURE — 84146 ASSAY OF PROLACTIN: CPT

## 2022-09-28 PROCEDURE — 83001 ASSAY OF GONADOTROPIN (FSH): CPT

## 2022-09-28 PROCEDURE — 80053 COMPREHEN METABOLIC PANEL: CPT

## 2022-09-29 NOTE — PROGRESS NOTES
Home enrollment completed for the 30 day CAXAOT Heart monitoring program per Tai Shaffer MD.  >Monitor to be shipped to patient by [employer].  >Pending Baseline.  >Pending EOS.

## 2022-10-05 ENCOUNTER — HOSPITAL ENCOUNTER (OUTPATIENT)
Dept: LAB | Facility: MEDICAL CENTER | Age: 37
End: 2022-10-05
Attending: OBSTETRICS & GYNECOLOGY
Payer: COMMERCIAL

## 2022-10-05 LAB
EST. AVERAGE GLUCOSE BLD GHB EST-MCNC: 111 MG/DL
FASTING STATUS PATIENT QL REPORTED: NORMAL
GLUCOSE SERPL-MCNC: 98 MG/DL (ref 65–99)
HBA1C MFR BLD: 5.5 % (ref 4–5.6)

## 2022-10-05 PROCEDURE — 83036 HEMOGLOBIN GLYCOSYLATED A1C: CPT

## 2022-10-05 PROCEDURE — 82947 ASSAY GLUCOSE BLOOD QUANT: CPT

## 2022-10-05 PROCEDURE — 36415 COLL VENOUS BLD VENIPUNCTURE: CPT

## 2022-10-10 ENCOUNTER — HOSPITAL ENCOUNTER (OUTPATIENT)
Dept: CARDIOLOGY | Facility: MEDICAL CENTER | Age: 37
End: 2022-10-10
Attending: INTERNAL MEDICINE
Payer: COMMERCIAL

## 2022-10-10 DIAGNOSIS — I47.10 PAROXYSMAL SUPRAVENTRICULAR TACHYCARDIA (HCC): ICD-10-CM

## 2022-10-10 DIAGNOSIS — R00.2 PALPITATIONS: ICD-10-CM

## 2022-10-10 DIAGNOSIS — U09.9 POST-COVID SYNDROME: ICD-10-CM

## 2022-10-10 PROCEDURE — 93306 TTE W/DOPPLER COMPLETE: CPT

## 2022-10-11 LAB
LV EJECT FRACT  99904: 65
LV EJECT FRACT MOD 2C 99903: 68.82
LV EJECT FRACT MOD 4C 99902: 70.42
LV EJECT FRACT MOD BP 99901: 69.38

## 2022-10-11 PROCEDURE — 93306 TTE W/DOPPLER COMPLETE: CPT | Mod: 26 | Performed by: INTERNAL MEDICINE

## 2022-10-20 ENCOUNTER — TELEPHONE (OUTPATIENT)
Dept: CARDIOLOGY | Facility: MEDICAL CENTER | Age: 37
End: 2022-10-20
Payer: COMMERCIAL

## 2022-10-20 NOTE — PROGRESS NOTES
Pt wore monitor for 10 days, broke out in a bad blister rash. Advised pt of hypoallergenic electrodes and flex adapter, pt stated [employer] advised her to just box up and send back due to skin reaction. 10/20/22.

## 2022-10-20 NOTE — TELEPHONE ENCOUNTER
MONITOR UPDATE    Caller: Benito Fish    Topic/issue: HOLTER MONITOR    Patient states that she had a Holter Monitor for 30 days, however because the patches were causing her skin to blister she had to stop the monitoring after day 10. Please advise.    Thank you,  Darion BRADEN    Callback Number: [phone] (home)

## 2022-10-20 NOTE — TELEPHONE ENCOUNTER
Daryl advised pt to take monitor off and send back after 10 days of wear due to bad rash and blisters.      LVM for pt to look in monitor package bottom box has flex adapter with hypoallergenic electrodes, my personal number, call Daryl or myself for any placment help on 10/20/22

## 2022-10-24 ENCOUNTER — TELEPHONE (OUTPATIENT)
Dept: CARDIOLOGY | Facility: MEDICAL CENTER | Age: 37
End: 2022-10-24
Payer: COMMERCIAL

## 2022-10-26 PROCEDURE — 93268 ECG RECORD/REVIEW: CPT | Performed by: INTERNAL MEDICINE

## 2022-10-27 ENCOUNTER — HOSPITAL ENCOUNTER (OUTPATIENT)
Dept: LAB | Facility: MEDICAL CENTER | Age: 37
End: 2022-10-27
Attending: PHYSICIAN ASSISTANT
Payer: COMMERCIAL

## 2022-10-27 LAB
25(OH)D3 SERPL-MCNC: 42 NG/ML (ref 30–100)
ALBUMIN SERPL BCP-MCNC: 4.3 G/DL (ref 3.2–4.9)
ALBUMIN/GLOB SERPL: 1.7 G/DL
ALP SERPL-CCNC: 60 U/L (ref 30–99)
ALT SERPL-CCNC: 18 U/L (ref 2–50)
ANION GAP SERPL CALC-SCNC: 10 MMOL/L (ref 7–16)
AST SERPL-CCNC: 19 U/L (ref 12–45)
BASOPHILS # BLD AUTO: 1 % (ref 0–1.8)
BASOPHILS # BLD: 0.05 K/UL (ref 0–0.12)
BILIRUB SERPL-MCNC: 0.5 MG/DL (ref 0.1–1.5)
BUN SERPL-MCNC: 23 MG/DL (ref 8–22)
CALCIUM SERPL-MCNC: 9 MG/DL (ref 8.5–10.5)
CHLORIDE SERPL-SCNC: 109 MMOL/L (ref 96–112)
CHOLEST SERPL-MCNC: 210 MG/DL (ref 100–199)
CO2 SERPL-SCNC: 21 MMOL/L (ref 20–33)
CREAT SERPL-MCNC: 0.85 MG/DL (ref 0.5–1.4)
EOSINOPHIL # BLD AUTO: 0.16 K/UL (ref 0–0.51)
EOSINOPHIL NFR BLD: 3.3 % (ref 0–6.9)
ERYTHROCYTE [DISTWIDTH] IN BLOOD BY AUTOMATED COUNT: 46.8 FL (ref 35.9–50)
FASTING STATUS PATIENT QL REPORTED: NORMAL
FERRITIN SERPL-MCNC: 29 NG/ML (ref 10–291)
GFR SERPLBLD CREATININE-BSD FMLA CKD-EPI: 91 ML/MIN/1.73 M 2
GLOBULIN SER CALC-MCNC: 2.5 G/DL (ref 1.9–3.5)
GLUCOSE SERPL-MCNC: 93 MG/DL (ref 65–99)
HCT VFR BLD AUTO: 43.7 % (ref 37–47)
HDLC SERPL-MCNC: 47 MG/DL
HGB BLD-MCNC: 14.7 G/DL (ref 12–16)
IMM GRANULOCYTES # BLD AUTO: 0.01 K/UL (ref 0–0.11)
IMM GRANULOCYTES NFR BLD AUTO: 0.2 % (ref 0–0.9)
IRON SATN MFR SERPL: 39 % (ref 15–55)
IRON SERPL-MCNC: 150 UG/DL (ref 40–170)
LDLC SERPL CALC-MCNC: 148 MG/DL
LYMPHOCYTES # BLD AUTO: 1.99 K/UL (ref 1–4.8)
LYMPHOCYTES NFR BLD: 40.9 % (ref 22–41)
MCH RBC QN AUTO: 28.6 PG (ref 27–33)
MCHC RBC AUTO-ENTMCNC: 33.6 G/DL (ref 33.6–35)
MCV RBC AUTO: 85 FL (ref 81.4–97.8)
MONOCYTES # BLD AUTO: 0.4 K/UL (ref 0–0.85)
MONOCYTES NFR BLD AUTO: 8.2 % (ref 0–13.4)
NEUTROPHILS # BLD AUTO: 2.26 K/UL (ref 2–7.15)
NEUTROPHILS NFR BLD: 46.4 % (ref 44–72)
NRBC # BLD AUTO: 0 K/UL
NRBC BLD-RTO: 0 /100 WBC
PLATELET # BLD AUTO: 235 K/UL (ref 164–446)
PMV BLD AUTO: 10.8 FL (ref 9–12.9)
POTASSIUM SERPL-SCNC: 4.1 MMOL/L (ref 3.6–5.5)
PROT SERPL-MCNC: 6.8 G/DL (ref 6–8.2)
RBC # BLD AUTO: 5.14 M/UL (ref 4.2–5.4)
SODIUM SERPL-SCNC: 140 MMOL/L (ref 135–145)
T3FREE SERPL-MCNC: 3.6 PG/ML (ref 2–4.4)
THYROPEROXIDASE AB SERPL-ACNC: <9 IU/ML (ref 0–9)
TIBC SERPL-MCNC: 380 UG/DL (ref 250–450)
TRIGL SERPL-MCNC: 75 MG/DL (ref 0–149)
TSH SERPL DL<=0.005 MIU/L-ACNC: 2.6 UIU/ML (ref 0.38–5.33)
UIBC SERPL-MCNC: 230 UG/DL (ref 110–370)
WBC # BLD AUTO: 4.9 K/UL (ref 4.8–10.8)

## 2022-10-27 PROCEDURE — 83516 IMMUNOASSAY NONANTIBODY: CPT

## 2022-10-27 PROCEDURE — 84481 FREE ASSAY (FT-3): CPT

## 2022-10-27 PROCEDURE — 36415 COLL VENOUS BLD VENIPUNCTURE: CPT

## 2022-10-27 PROCEDURE — 82728 ASSAY OF FERRITIN: CPT

## 2022-10-27 PROCEDURE — 86376 MICROSOMAL ANTIBODY EACH: CPT

## 2022-10-27 PROCEDURE — 85025 COMPLETE CBC W/AUTO DIFF WBC: CPT

## 2022-10-27 PROCEDURE — 83550 IRON BINDING TEST: CPT

## 2022-10-27 PROCEDURE — 80061 LIPID PANEL: CPT

## 2022-10-27 PROCEDURE — 83540 ASSAY OF IRON: CPT

## 2022-10-27 PROCEDURE — 84439 ASSAY OF FREE THYROXINE: CPT

## 2022-10-27 PROCEDURE — 84443 ASSAY THYROID STIM HORMONE: CPT

## 2022-10-27 PROCEDURE — 80053 COMPREHEN METABOLIC PANEL: CPT

## 2022-10-27 PROCEDURE — 82306 VITAMIN D 25 HYDROXY: CPT

## 2022-10-31 LAB — GLIADIN PEPTIDE+TTG IGA+IGG SER QL IA: 9 UNITS (ref 0–19)

## 2022-11-02 LAB — T4 FREE SERPL DIALY-MCNC: 1.4 NG/DL (ref 1.1–2.4)

## 2022-12-19 ENCOUNTER — HOSPITAL ENCOUNTER (OUTPATIENT)
Dept: LAB | Facility: MEDICAL CENTER | Age: 37
End: 2022-12-19
Attending: INTERNAL MEDICINE
Payer: COMMERCIAL

## 2022-12-19 LAB
25(OH)D3 SERPL-MCNC: 37 NG/ML (ref 30–100)
ESTRADIOL SERPL-MCNC: 43.2 PG/ML
FSH SERPL-ACNC: 8 MIU/ML
LH SERPL-ACNC: 5.8 IU/L
PROLACTIN SERPL-MCNC: 13.3 NG/ML (ref 2.8–26)
T3FREE SERPL-MCNC: 3.26 PG/ML (ref 2–4.4)
T4 FREE SERPL-MCNC: 1.23 NG/DL (ref 0.93–1.7)
TESTOST SERPL-MCNC: <12 NG/DL (ref 9–75)
TSH SERPL DL<=0.005 MIU/L-ACNC: 1.48 UIU/ML (ref 0.38–5.33)
VIT B12 SERPL-MCNC: 953 PG/ML (ref 211–911)

## 2022-12-19 PROCEDURE — 82607 VITAMIN B-12: CPT

## 2022-12-19 PROCEDURE — 84443 ASSAY THYROID STIM HORMONE: CPT

## 2022-12-19 PROCEDURE — 83001 ASSAY OF GONADOTROPIN (FSH): CPT

## 2022-12-19 PROCEDURE — 84439 ASSAY OF FREE THYROXINE: CPT

## 2022-12-19 PROCEDURE — 84146 ASSAY OF PROLACTIN: CPT

## 2022-12-19 PROCEDURE — 84403 ASSAY OF TOTAL TESTOSTERONE: CPT

## 2022-12-19 PROCEDURE — 84270 ASSAY OF SEX HORMONE GLOBUL: CPT

## 2022-12-19 PROCEDURE — 82670 ASSAY OF TOTAL ESTRADIOL: CPT

## 2022-12-19 PROCEDURE — 84481 FREE ASSAY (FT-3): CPT

## 2022-12-19 PROCEDURE — 83002 ASSAY OF GONADOTROPIN (LH): CPT

## 2022-12-19 PROCEDURE — 84305 ASSAY OF SOMATOMEDIN: CPT

## 2022-12-19 PROCEDURE — 82306 VITAMIN D 25 HYDROXY: CPT

## 2022-12-20 ENCOUNTER — HOSPITAL ENCOUNTER (OUTPATIENT)
Dept: LAB | Facility: MEDICAL CENTER | Age: 37
End: 2022-12-20
Attending: INTERNAL MEDICINE
Payer: COMMERCIAL

## 2022-12-20 PROCEDURE — 36415 COLL VENOUS BLD VENIPUNCTURE: CPT

## 2022-12-20 PROCEDURE — 82024 ASSAY OF ACTH: CPT

## 2022-12-20 PROCEDURE — 82533 TOTAL CORTISOL: CPT

## 2022-12-21 LAB
CORTIS SERPL-MCNC: 12.4 UG/DL (ref 0–23)
IGF-I SERPL-MCNC: 222 NG/ML (ref 80–277)
IGF-I Z-SCORE SERPL: 1.1
SHBG SERPL-SCNC: 27 NMOL/L (ref 25–122)

## 2022-12-22 LAB — ACTH PLAS-MCNC: 14 PG/ML (ref 7.2–63.3)

## 2023-01-11 ENCOUNTER — APPOINTMENT (OUTPATIENT)
Dept: RADIOLOGY | Facility: MEDICAL CENTER | Age: 38
End: 2023-01-11
Attending: INTERNAL MEDICINE
Payer: COMMERCIAL

## 2023-01-11 DIAGNOSIS — E03.9 PRIMARY HYPOTHYROIDISM: ICD-10-CM

## 2023-01-11 PROCEDURE — 76536 US EXAM OF HEAD AND NECK: CPT

## 2023-02-10 ENCOUNTER — APPOINTMENT (RX ONLY)
Dept: URBAN - METROPOLITAN AREA CLINIC 31 | Facility: CLINIC | Age: 38
Setting detail: DERMATOLOGY
End: 2023-02-10

## 2023-02-10 DIAGNOSIS — D485 NEOPLASM OF UNCERTAIN BEHAVIOR OF SKIN: ICD-10-CM

## 2023-02-10 DIAGNOSIS — D18.0 HEMANGIOMA: ICD-10-CM

## 2023-02-10 DIAGNOSIS — L81.4 OTHER MELANIN HYPERPIGMENTATION: ICD-10-CM

## 2023-02-10 DIAGNOSIS — Z71.89 OTHER SPECIFIED COUNSELING: ICD-10-CM

## 2023-02-10 DIAGNOSIS — L57.8 OTHER SKIN CHANGES DUE TO CHRONIC EXPOSURE TO NONIONIZING RADIATION: ICD-10-CM

## 2023-02-10 DIAGNOSIS — L82.1 OTHER SEBORRHEIC KERATOSIS: ICD-10-CM

## 2023-02-10 DIAGNOSIS — L90.5 SCAR CONDITIONS AND FIBROSIS OF SKIN: ICD-10-CM

## 2023-02-10 DIAGNOSIS — D22 MELANOCYTIC NEVI: ICD-10-CM

## 2023-02-10 PROBLEM — D18.01 HEMANGIOMA OF SKIN AND SUBCUTANEOUS TISSUE: Status: ACTIVE | Noted: 2023-02-10

## 2023-02-10 PROBLEM — D22.62 MELANOCYTIC NEVI OF LEFT UPPER LIMB, INCLUDING SHOULDER: Status: ACTIVE | Noted: 2023-02-10

## 2023-02-10 PROBLEM — D48.5 NEOPLASM OF UNCERTAIN BEHAVIOR OF SKIN: Status: ACTIVE | Noted: 2023-02-10

## 2023-02-10 PROBLEM — D22.71 MELANOCYTIC NEVI OF RIGHT LOWER LIMB, INCLUDING HIP: Status: ACTIVE | Noted: 2023-02-10

## 2023-02-10 PROBLEM — D22.5 MELANOCYTIC NEVI OF TRUNK: Status: ACTIVE | Noted: 2023-02-10

## 2023-02-10 PROBLEM — D22.61 MELANOCYTIC NEVI OF RIGHT UPPER LIMB, INCLUDING SHOULDER: Status: ACTIVE | Noted: 2023-02-10

## 2023-02-10 PROCEDURE — 11103 TANGNTL BX SKIN EA SEP/ADDL: CPT

## 2023-02-10 PROCEDURE — ? BIOPSY BY SHAVE METHOD

## 2023-02-10 PROCEDURE — ? COUNSELING

## 2023-02-10 PROCEDURE — 11102 TANGNTL BX SKIN SINGLE LES: CPT

## 2023-02-10 PROCEDURE — 99213 OFFICE O/P EST LOW 20 MIN: CPT | Mod: 25

## 2023-02-10 ASSESSMENT — LOCATION SIMPLE DESCRIPTION DERM
LOCATION SIMPLE: RIGHT UPPER BACK
LOCATION SIMPLE: RIGHT PRETIBIAL REGION
LOCATION SIMPLE: LEFT FOREARM
LOCATION SIMPLE: RIGHT POSTERIOR UPPER ARM
LOCATION SIMPLE: LEFT UPPER BACK
LOCATION SIMPLE: RIGHT FOREARM
LOCATION SIMPLE: LEFT UPPER ARM
LOCATION SIMPLE: LOWER BACK
LOCATION SIMPLE: RIGHT UPPER ARM
LOCATION SIMPLE: ABDOMEN
LOCATION SIMPLE: CHEST
LOCATION SIMPLE: LEFT POSTERIOR UPPER ARM
LOCATION SIMPLE: LEFT NOSE

## 2023-02-10 ASSESSMENT — LOCATION DETAILED DESCRIPTION DERM
LOCATION DETAILED: RIGHT PROXIMAL POSTERIOR UPPER ARM
LOCATION DETAILED: LEFT PROXIMAL DORSAL FOREARM
LOCATION DETAILED: RIGHT MEDIAL SUPERIOR CHEST
LOCATION DETAILED: LEFT ANTERIOR PROXIMAL UPPER ARM
LOCATION DETAILED: LEFT LATERAL ABDOMEN
LOCATION DETAILED: LEFT DISTAL POSTERIOR UPPER ARM
LOCATION DETAILED: RIGHT SUPERIOR UPPER BACK
LOCATION DETAILED: LEFT INFERIOR UPPER BACK
LOCATION DETAILED: RIGHT PROXIMAL DORSAL FOREARM
LOCATION DETAILED: RIGHT PROXIMAL PRETIBIAL REGION
LOCATION DETAILED: PERIUMBILICAL SKIN
LOCATION DETAILED: RIGHT MEDIAL UPPER BACK
LOCATION DETAILED: SUPERIOR LUMBAR SPINE
LOCATION DETAILED: LEFT NASAL SIDEWALL
LOCATION DETAILED: LEFT LATERAL SUPERIOR CHEST
LOCATION DETAILED: LEFT SUPERIOR UPPER BACK
LOCATION DETAILED: RIGHT ANTERIOR PROXIMAL UPPER ARM
LOCATION DETAILED: LEFT PROXIMAL POSTERIOR UPPER ARM
LOCATION DETAILED: RIGHT SUPERIOR MEDIAL UPPER BACK

## 2023-02-10 ASSESSMENT — LOCATION ZONE DERM
LOCATION ZONE: TRUNK
LOCATION ZONE: LEG
LOCATION ZONE: ARM
LOCATION ZONE: NOSE

## 2023-02-10 NOTE — HPI: SKIN LESIONS
Is This A New Presentation, Or A Follow-Up?: Skin Lesions
How Severe Is Your Skin Lesion?: mild
Have Your Skin Lesions Been Treated?: not been treated
Which Family Member (Optional)?: Mom,dad

## 2023-04-10 ENCOUNTER — HOSPITAL ENCOUNTER (OUTPATIENT)
Dept: LAB | Facility: MEDICAL CENTER | Age: 38
End: 2023-04-10
Attending: INTERNAL MEDICINE
Payer: COMMERCIAL

## 2023-04-10 LAB
T3FREE SERPL-MCNC: 2.75 PG/ML (ref 2–4.4)
T4 FREE SERPL-MCNC: 1.14 NG/DL (ref 0.93–1.7)
TSH SERPL DL<=0.005 MIU/L-ACNC: 1.74 UIU/ML (ref 0.38–5.33)

## 2023-04-10 PROCEDURE — 36415 COLL VENOUS BLD VENIPUNCTURE: CPT

## 2023-04-10 PROCEDURE — 84443 ASSAY THYROID STIM HORMONE: CPT

## 2023-04-10 PROCEDURE — 84481 FREE ASSAY (FT-3): CPT

## 2023-04-10 PROCEDURE — 84439 ASSAY OF FREE THYROXINE: CPT

## 2023-05-30 ENCOUNTER — HOSPITAL ENCOUNTER (OUTPATIENT)
Dept: LAB | Facility: MEDICAL CENTER | Age: 38
End: 2023-05-30
Attending: PHYSICIAN ASSISTANT
Payer: COMMERCIAL

## 2023-05-30 LAB
CHOLEST SERPL-MCNC: 192 MG/DL (ref 100–199)
FASTING STATUS PATIENT QL REPORTED: NORMAL
HDLC SERPL-MCNC: 45 MG/DL
LDLC SERPL CALC-MCNC: 127 MG/DL
TRIGL SERPL-MCNC: 98 MG/DL (ref 0–149)

## 2023-05-30 PROCEDURE — 36415 COLL VENOUS BLD VENIPUNCTURE: CPT

## 2023-05-30 PROCEDURE — 80061 LIPID PANEL: CPT
